# Patient Record
Sex: FEMALE | Race: WHITE | NOT HISPANIC OR LATINO
[De-identification: names, ages, dates, MRNs, and addresses within clinical notes are randomized per-mention and may not be internally consistent; named-entity substitution may affect disease eponyms.]

---

## 2017-01-04 ENCOUNTER — APPOINTMENT (OUTPATIENT)
Dept: PSYCHIATRY | Facility: CLINIC | Age: 67
End: 2017-01-04

## 2017-01-05 ENCOUNTER — APPOINTMENT (OUTPATIENT)
Dept: INTERNAL MEDICINE | Facility: CLINIC | Age: 67
End: 2017-01-05

## 2017-01-05 VITALS — SYSTOLIC BLOOD PRESSURE: 110 MMHG | DIASTOLIC BLOOD PRESSURE: 65 MMHG

## 2017-01-05 VITALS
HEART RATE: 65 BPM | SYSTOLIC BLOOD PRESSURE: 110 MMHG | TEMPERATURE: 97.6 F | RESPIRATION RATE: 14 BRPM | DIASTOLIC BLOOD PRESSURE: 70 MMHG

## 2017-01-09 ENCOUNTER — APPOINTMENT (OUTPATIENT)
Dept: PSYCHIATRY | Facility: CLINIC | Age: 67
End: 2017-01-09

## 2017-01-11 ENCOUNTER — APPOINTMENT (OUTPATIENT)
Dept: PSYCHIATRY | Facility: CLINIC | Age: 67
End: 2017-01-11

## 2017-01-17 ENCOUNTER — APPOINTMENT (OUTPATIENT)
Dept: OTOLARYNGOLOGY | Facility: CLINIC | Age: 67
End: 2017-01-17

## 2017-01-17 VITALS
BODY MASS INDEX: 21.35 KG/M2 | DIASTOLIC BLOOD PRESSURE: 75 MMHG | WEIGHT: 116 LBS | HEIGHT: 62 IN | SYSTOLIC BLOOD PRESSURE: 134 MMHG | HEART RATE: 64 BPM

## 2017-01-17 DIAGNOSIS — J34.2 DEVIATED NASAL SEPTUM: ICD-10-CM

## 2017-01-17 DIAGNOSIS — J34.3 HYPERTROPHY OF NASAL TURBINATES: ICD-10-CM

## 2017-01-18 ENCOUNTER — APPOINTMENT (OUTPATIENT)
Dept: PSYCHIATRY | Facility: CLINIC | Age: 67
End: 2017-01-18

## 2017-01-22 PROBLEM — J34.3 HYPERTROPHY OF BOTH INFERIOR NASAL TURBINATES: Status: ACTIVE | Noted: 2017-01-22

## 2017-01-23 ENCOUNTER — APPOINTMENT (OUTPATIENT)
Dept: PSYCHIATRY | Facility: CLINIC | Age: 67
End: 2017-01-23

## 2017-01-25 ENCOUNTER — APPOINTMENT (OUTPATIENT)
Dept: PSYCHIATRY | Facility: CLINIC | Age: 67
End: 2017-01-25

## 2017-01-30 ENCOUNTER — APPOINTMENT (OUTPATIENT)
Dept: PSYCHIATRY | Facility: CLINIC | Age: 67
End: 2017-01-30

## 2017-02-01 ENCOUNTER — OUTPATIENT (OUTPATIENT)
Dept: OUTPATIENT SERVICES | Facility: HOSPITAL | Age: 67
LOS: 1 days | Discharge: HOME | End: 2017-02-01

## 2017-02-01 ENCOUNTER — APPOINTMENT (OUTPATIENT)
Dept: PSYCHIATRY | Facility: CLINIC | Age: 67
End: 2017-02-01

## 2017-02-06 ENCOUNTER — APPOINTMENT (OUTPATIENT)
Dept: PSYCHIATRY | Facility: CLINIC | Age: 67
End: 2017-02-06

## 2017-02-08 ENCOUNTER — APPOINTMENT (OUTPATIENT)
Dept: PSYCHIATRY | Facility: CLINIC | Age: 67
End: 2017-02-08

## 2017-02-13 ENCOUNTER — APPOINTMENT (OUTPATIENT)
Dept: PSYCHIATRY | Facility: CLINIC | Age: 67
End: 2017-02-13

## 2017-02-15 ENCOUNTER — APPOINTMENT (OUTPATIENT)
Dept: PSYCHIATRY | Facility: CLINIC | Age: 67
End: 2017-02-15

## 2017-02-22 ENCOUNTER — APPOINTMENT (OUTPATIENT)
Dept: PSYCHIATRY | Facility: CLINIC | Age: 67
End: 2017-02-22

## 2017-02-23 DIAGNOSIS — F34.1 DYSTHYMIC DISORDER: ICD-10-CM

## 2017-02-27 ENCOUNTER — APPOINTMENT (OUTPATIENT)
Dept: PSYCHIATRY | Facility: CLINIC | Age: 67
End: 2017-02-27

## 2017-03-01 ENCOUNTER — APPOINTMENT (OUTPATIENT)
Dept: PSYCHIATRY | Facility: CLINIC | Age: 67
End: 2017-03-01

## 2017-03-06 ENCOUNTER — APPOINTMENT (OUTPATIENT)
Dept: PSYCHIATRY | Facility: CLINIC | Age: 67
End: 2017-03-06

## 2017-03-08 ENCOUNTER — APPOINTMENT (OUTPATIENT)
Dept: PSYCHIATRY | Facility: CLINIC | Age: 67
End: 2017-03-08

## 2017-03-09 ENCOUNTER — APPOINTMENT (OUTPATIENT)
Dept: PSYCHIATRY | Facility: CLINIC | Age: 67
End: 2017-03-09

## 2017-03-13 ENCOUNTER — APPOINTMENT (OUTPATIENT)
Dept: PSYCHIATRY | Facility: CLINIC | Age: 67
End: 2017-03-13

## 2017-03-15 ENCOUNTER — APPOINTMENT (OUTPATIENT)
Dept: PSYCHIATRY | Facility: CLINIC | Age: 67
End: 2017-03-15

## 2017-03-20 ENCOUNTER — APPOINTMENT (OUTPATIENT)
Dept: PSYCHIATRY | Facility: CLINIC | Age: 67
End: 2017-03-20

## 2017-03-22 ENCOUNTER — APPOINTMENT (OUTPATIENT)
Dept: PSYCHIATRY | Facility: CLINIC | Age: 67
End: 2017-03-22

## 2017-03-27 ENCOUNTER — APPOINTMENT (OUTPATIENT)
Dept: PSYCHIATRY | Facility: CLINIC | Age: 67
End: 2017-03-27

## 2017-03-29 ENCOUNTER — APPOINTMENT (OUTPATIENT)
Dept: PSYCHIATRY | Facility: CLINIC | Age: 67
End: 2017-03-29

## 2017-04-03 ENCOUNTER — APPOINTMENT (OUTPATIENT)
Dept: PSYCHIATRY | Facility: CLINIC | Age: 67
End: 2017-04-03

## 2017-04-03 ENCOUNTER — EMERGENCY (EMERGENCY)
Facility: HOSPITAL | Age: 67
LOS: 1 days | Discharge: HOME | End: 2017-04-03
Attending: EMERGENCY MEDICINE | Admitting: EMERGENCY MEDICINE
Payer: MEDICAID

## 2017-04-03 VITALS
RESPIRATION RATE: 17 BRPM | DIASTOLIC BLOOD PRESSURE: 77 MMHG | HEART RATE: 75 BPM | OXYGEN SATURATION: 97 % | HEIGHT: 64 IN | WEIGHT: 115.08 LBS | TEMPERATURE: 98 F | SYSTOLIC BLOOD PRESSURE: 153 MMHG

## 2017-04-03 VITALS
OXYGEN SATURATION: 98 % | HEART RATE: 68 BPM | RESPIRATION RATE: 16 BRPM | SYSTOLIC BLOOD PRESSURE: 141 MMHG | DIASTOLIC BLOOD PRESSURE: 64 MMHG | TEMPERATURE: 98 F

## 2017-04-03 DIAGNOSIS — E78.00 PURE HYPERCHOLESTEROLEMIA, UNSPECIFIED: ICD-10-CM

## 2017-04-03 DIAGNOSIS — S69.92XA UNSPECIFIED INJURY OF LEFT WRIST, HAND AND FINGER(S), INITIAL ENCOUNTER: ICD-10-CM
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
## 2017-04-03 DIAGNOSIS — Z88.0 ALLERGY STATUS TO PENICILLIN: ICD-10-CM
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
## 2017-04-03 DIAGNOSIS — S52.592A OTHER FRACTURES OF LOWER END OF LEFT RADIUS, INITIAL ENCOUNTER FOR CLOSED FRACTURE: ICD-10-CM

## 2017-04-03 DIAGNOSIS — I10 ESSENTIAL (PRIMARY) HYPERTENSION: ICD-10-CM
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
## 2017-04-03 PROCEDURE — 99285 EMERGENCY DEPT VISIT HI MDM: CPT | Mod: 25

## 2017-04-03 PROCEDURE — 73110 X-RAY EXAM OF WRIST: CPT

## 2017-04-03 PROCEDURE — 72100 X-RAY EXAM L-S SPINE 2/3 VWS: CPT | Mod: 26

## 2017-04-03 PROCEDURE — 73110 X-RAY EXAM OF WRIST: CPT | Mod: 26,LT

## 2017-04-03 PROCEDURE — 72100 X-RAY EXAM L-S SPINE 2/3 VWS: CPT

## 2017-04-03 PROCEDURE — 25605 CLTX DST RDL FX/EPHYS SEP W/: CPT | Mod: LT

## 2017-04-03 PROCEDURE — 99284 EMERGENCY DEPT VISIT MOD MDM: CPT

## 2017-04-03 RX ORDER — ACETAMINOPHEN 500 MG
975 TABLET ORAL ONCE
Qty: 0 | Refills: 0 | Status: COMPLETED | OUTPATIENT
Start: 2017-04-03 | End: 2017-04-03

## 2017-04-03 RX ADMIN — Medication 975 MILLIGRAM(S): at 17:55

## 2017-04-03 NOTE — CONSULT NOTE ADULT - SUBJECTIVE AND OBJECTIVE BOX
Orthopaedic Consult Note    Consult Requested by: ED  Surgeon: Dr. Whitmore    CC:Patient is a 66y old  Female who presents with a chief complaint of L wrist pain    HPI  66yFemale  c/o L wrist pain after fall while cleaning at home.     PAST MEDICAL & SURGICAL HISTORY:  High cholesterol  HTN (hypertension)  High cholesterol  HTN (hypertension)  No significant past surgical history    Allergies    penicillin (Unknown)    Intolerances      MEDICATIONS  (STANDING):      Vital Signs Last 24 Hrs  T(C): 36.8, Max: 36.8 (04-03 @ 16:47)  T(F): 98.3, Max: 98.3 (04-03 @ 16:47)  HR: 75 (75 - 75)  BP: 153/77 (153/77 - 153/77)  BP(mean): --  RR: 17 (17 - 17)  SpO2: 97% (97% - 97%)    Physical Exam:  Left wrist pain to palpation   Able to range wrist joint   Motor 5/5 UN/RN/MN  Sensation intact  Moderate swelling at fracture site  wwp        Imaging:   Left distal radius fracture with dorsal displacement and loss of volar tilt and radial height  Reduction xrays confirming adequate reduction    A/P: 66yFemale w/ above diagnosis  - Verbal consent was obtained and risks of procedure explained  - Local anesthesia injected at fracture site with mild tinge of flashback  - 6cc lidocaine injected  - Forearm reduced with traction and then manual reduction with confirmation by c-arm  - U-shaped splint applied and 3-point molding achieved  - Adequate finger ROM and exam without any changes from pre-reduction exam was obtained postreduction  - Patient then placed in sling  - F/U in 2 weeks with Dr. Whitmore  - Pain control   - Elevation  Discussed with Dr. Molina senior resident

## 2017-04-03 NOTE — ED PROVIDER NOTE - OBJECTIVE STATEMENT
65 yo fem s/p fall backward off 1 foot high stepstool onto her low back and left wrist.  occurred today at home. Denies hitting head.  No neck/back pain. No LOC.  c/o pain left wrist.  no tingling/numbness.

## 2017-04-03 NOTE — ED ADULT NURSE NOTE - CHPI ED SYMPTOMS NEG
no fever/no tingling/no vomiting/no numbness/no decreased eating/drinking/no dizziness/no weakness/no chills/no nausea

## 2017-04-03 NOTE — ED PROVIDER NOTE - MEDICAL DECISION MAKING DETAILS
67 yo fem s/p fall backward off 1 foot high stepstool onto her low back and left wrist.  No head injury and no c-spine imaging needed per NEXUS criteria.  XR shows left distal radiaus colles fx, reduced and splinted by ortho.

## 2017-04-03 NOTE — ED ADULT NURSE NOTE - OBJECTIVE STATEMENT
67 yo F c/o left wrist pain after fall.  Pt states "I was cleaning my mirror when I fell, I put my hand behind me so I wouldn't hurt my back and it now hurts to move".  Pt denies dizziness, lightheadedness before fall and states "I lost balance and fell".  Pt states wrist hurts with movement. Pt A&Ox3 with no s.s of acute distress. Will continue to monitor.

## 2017-04-03 NOTE — ED PROVIDER NOTE - PHYSICAL EXAMINATION
CONSTITUTIONAL: WD,WN. NAD.    HEAD: NC/AT  SKIN: Normal color and turgor. No abr/lac/bruise  EYES: Conjunctiva clear. EOMI. PERRL.    ENT: Nasal mucosa clear, no rhinorrhea.  Pharynx clear, no injection/edema/exudate, uvula is midline and without edema.    RESPIRATORY:  Breathing non-labored. No retractions, accessory muscle use.  Lungs CTA bilaterally.  CARDIOVASCULAR:  RRR, S1S2. No M/R/G.      GI:   Bowel sounds present. Abdomen soft, nontender.    MSK: Lt wrist tenderness and slight colles deformity.  No neck or back tenderness. Painless FROM.  No lower extremity edema or calf tenderness.    NEURO: Alert and oriented; normal motor and sensory function in MRU.  CN II-XII intact. Coordination: normal F-N, normal gait.

## 2017-04-03 NOTE — ED ADULT NURSE REASSESSMENT NOTE - NS ED NURSE REASSESS COMMENT FT1
Pt A&Ox3 with no s.s of acute distress. Pt OOB with steady gait. Pt awaiting repeat x-ray.  Will continue to monitor.

## 2017-04-03 NOTE — ED ADULT TRIAGE NOTE - CHIEF COMPLAINT QUOTE
Pt c/o L wrist pain status post fall . Notable swelling to L wrist. No bruising noted. Radial pulses present. Cap refill 1 second.

## 2017-04-03 NOTE — ED PROVIDER NOTE - NS ED ROS FT
CONSTITUTIONAL: No fever, chills, or weakness  NEURO: No headache, no dizziness, no syncope; No weakness/tingling/numbness  EYES: No visual changes  ENT: No rhinorrhea or sore throat  PULM: No cough or dyspnea  CV: No chest pain or palpitations  GI: No abdominal pain, vomiting, or diarrhea  : No dysuria, hematuria, frequency  MSK: No neck pain or back pain  SKIN: no abr lac bruise

## 2017-04-05 ENCOUNTER — APPOINTMENT (OUTPATIENT)
Dept: PSYCHIATRY | Facility: CLINIC | Age: 67
End: 2017-04-05

## 2017-04-10 ENCOUNTER — APPOINTMENT (OUTPATIENT)
Dept: PSYCHIATRY | Facility: CLINIC | Age: 67
End: 2017-04-10

## 2017-04-12 ENCOUNTER — APPOINTMENT (OUTPATIENT)
Dept: PSYCHIATRY | Facility: CLINIC | Age: 67
End: 2017-04-12

## 2017-04-13 ENCOUNTER — APPOINTMENT (OUTPATIENT)
Dept: INTERNAL MEDICINE | Facility: CLINIC | Age: 67
End: 2017-04-13

## 2017-04-17 ENCOUNTER — APPOINTMENT (OUTPATIENT)
Dept: PSYCHIATRY | Facility: CLINIC | Age: 67
End: 2017-04-17

## 2017-04-19 ENCOUNTER — APPOINTMENT (OUTPATIENT)
Dept: PSYCHIATRY | Facility: CLINIC | Age: 67
End: 2017-04-19

## 2017-04-24 ENCOUNTER — APPOINTMENT (OUTPATIENT)
Dept: PSYCHIATRY | Facility: CLINIC | Age: 67
End: 2017-04-24

## 2017-04-26 ENCOUNTER — APPOINTMENT (OUTPATIENT)
Dept: PSYCHIATRY | Facility: CLINIC | Age: 67
End: 2017-04-26

## 2017-05-01 ENCOUNTER — APPOINTMENT (OUTPATIENT)
Dept: PSYCHIATRY | Facility: CLINIC | Age: 67
End: 2017-05-01

## 2017-05-03 ENCOUNTER — APPOINTMENT (OUTPATIENT)
Dept: PSYCHIATRY | Facility: CLINIC | Age: 67
End: 2017-05-03

## 2017-05-08 ENCOUNTER — APPOINTMENT (OUTPATIENT)
Dept: PSYCHIATRY | Facility: CLINIC | Age: 67
End: 2017-05-08

## 2017-05-10 ENCOUNTER — APPOINTMENT (OUTPATIENT)
Dept: PSYCHIATRY | Facility: CLINIC | Age: 67
End: 2017-05-10

## 2017-05-15 ENCOUNTER — APPOINTMENT (OUTPATIENT)
Dept: PSYCHIATRY | Facility: CLINIC | Age: 67
End: 2017-05-15

## 2017-05-17 ENCOUNTER — APPOINTMENT (OUTPATIENT)
Dept: PSYCHIATRY | Facility: CLINIC | Age: 67
End: 2017-05-17

## 2017-05-22 ENCOUNTER — APPOINTMENT (OUTPATIENT)
Dept: PSYCHIATRY | Facility: CLINIC | Age: 67
End: 2017-05-22

## 2017-05-24 ENCOUNTER — APPOINTMENT (OUTPATIENT)
Dept: PSYCHIATRY | Facility: CLINIC | Age: 67
End: 2017-05-24

## 2017-05-31 ENCOUNTER — APPOINTMENT (OUTPATIENT)
Dept: PSYCHIATRY | Facility: CLINIC | Age: 67
End: 2017-05-31

## 2017-06-05 ENCOUNTER — APPOINTMENT (OUTPATIENT)
Dept: PSYCHIATRY | Facility: CLINIC | Age: 67
End: 2017-06-05

## 2017-06-07 ENCOUNTER — APPOINTMENT (OUTPATIENT)
Dept: PSYCHIATRY | Facility: CLINIC | Age: 67
End: 2017-06-07

## 2017-06-08 ENCOUNTER — APPOINTMENT (OUTPATIENT)
Dept: INTERNAL MEDICINE | Facility: CLINIC | Age: 67
End: 2017-06-08

## 2017-06-08 VITALS
BODY MASS INDEX: 21.22 KG/M2 | HEART RATE: 64 BPM | OXYGEN SATURATION: 98 % | DIASTOLIC BLOOD PRESSURE: 80 MMHG | SYSTOLIC BLOOD PRESSURE: 140 MMHG | TEMPERATURE: 97.8 F | WEIGHT: 116 LBS

## 2017-06-12 ENCOUNTER — APPOINTMENT (OUTPATIENT)
Dept: PSYCHIATRY | Facility: CLINIC | Age: 67
End: 2017-06-12

## 2017-06-14 ENCOUNTER — APPOINTMENT (OUTPATIENT)
Dept: PSYCHIATRY | Facility: CLINIC | Age: 67
End: 2017-06-14

## 2017-06-19 ENCOUNTER — APPOINTMENT (OUTPATIENT)
Dept: PSYCHIATRY | Facility: CLINIC | Age: 67
End: 2017-06-19

## 2017-06-21 ENCOUNTER — APPOINTMENT (OUTPATIENT)
Dept: PSYCHIATRY | Facility: CLINIC | Age: 67
End: 2017-06-21

## 2017-06-26 ENCOUNTER — APPOINTMENT (OUTPATIENT)
Dept: PSYCHIATRY | Facility: CLINIC | Age: 67
End: 2017-06-26

## 2017-06-28 ENCOUNTER — APPOINTMENT (OUTPATIENT)
Dept: PSYCHIATRY | Facility: CLINIC | Age: 67
End: 2017-06-28

## 2017-06-29 ENCOUNTER — MEDICATION RENEWAL (OUTPATIENT)
Age: 67
End: 2017-06-29

## 2017-07-05 ENCOUNTER — APPOINTMENT (OUTPATIENT)
Dept: PSYCHIATRY | Facility: CLINIC | Age: 67
End: 2017-07-05

## 2017-07-12 ENCOUNTER — APPOINTMENT (OUTPATIENT)
Dept: PSYCHIATRY | Facility: CLINIC | Age: 67
End: 2017-07-12

## 2017-07-19 ENCOUNTER — APPOINTMENT (OUTPATIENT)
Dept: PSYCHIATRY | Facility: CLINIC | Age: 67
End: 2017-07-19

## 2017-07-19 ENCOUNTER — APPOINTMENT (OUTPATIENT)
Dept: OPHTHALMOLOGY | Facility: CLINIC | Age: 67
End: 2017-07-19

## 2017-07-19 DIAGNOSIS — H26.9 UNSPECIFIED CATARACT: ICD-10-CM

## 2017-07-19 DIAGNOSIS — H35.433 PAVING STONE DEGENERATION OF RETINA, BILATERAL: ICD-10-CM

## 2017-07-26 ENCOUNTER — APPOINTMENT (OUTPATIENT)
Dept: PSYCHIATRY | Facility: CLINIC | Age: 67
End: 2017-07-26

## 2017-08-01 ENCOUNTER — OUTPATIENT (OUTPATIENT)
Dept: OUTPATIENT SERVICES | Facility: HOSPITAL | Age: 67
LOS: 1 days | Discharge: HOME | End: 2017-08-01

## 2017-08-02 ENCOUNTER — APPOINTMENT (OUTPATIENT)
Dept: PSYCHIATRY | Facility: CLINIC | Age: 67
End: 2017-08-02
Payer: MEDICAID

## 2017-08-02 PROCEDURE — 90853 GROUP PSYCHOTHERAPY: CPT

## 2017-08-02 PROCEDURE — 90834 PSYTX W PT 45 MINUTES: CPT | Mod: 59

## 2017-08-09 ENCOUNTER — APPOINTMENT (OUTPATIENT)
Dept: PSYCHIATRY | Facility: CLINIC | Age: 67
End: 2017-08-09

## 2017-08-16 ENCOUNTER — APPOINTMENT (OUTPATIENT)
Dept: PSYCHIATRY | Facility: CLINIC | Age: 67
End: 2017-08-16

## 2017-08-23 ENCOUNTER — APPOINTMENT (OUTPATIENT)
Dept: PSYCHIATRY | Facility: CLINIC | Age: 67
End: 2017-08-23
Payer: MEDICAID

## 2017-08-23 PROCEDURE — 90834 PSYTX W PT 45 MINUTES: CPT | Mod: 59

## 2017-08-23 PROCEDURE — 90853 GROUP PSYCHOTHERAPY: CPT

## 2017-08-30 ENCOUNTER — APPOINTMENT (OUTPATIENT)
Dept: PSYCHIATRY | Facility: CLINIC | Age: 67
End: 2017-08-30
Payer: MEDICAID

## 2017-08-30 PROCEDURE — 90834 PSYTX W PT 45 MINUTES: CPT | Mod: 59

## 2017-08-30 PROCEDURE — 90853 GROUP PSYCHOTHERAPY: CPT

## 2017-09-01 DIAGNOSIS — F32.9 MAJOR DEPRESSIVE DISORDER, SINGLE EPISODE, UNSPECIFIED: ICD-10-CM

## 2017-09-06 ENCOUNTER — APPOINTMENT (OUTPATIENT)
Dept: PSYCHIATRY | Facility: CLINIC | Age: 67
End: 2017-09-06
Payer: MEDICAID

## 2017-09-06 PROCEDURE — 90853 GROUP PSYCHOTHERAPY: CPT

## 2017-09-06 PROCEDURE — 90834 PSYTX W PT 45 MINUTES: CPT

## 2017-09-13 ENCOUNTER — APPOINTMENT (OUTPATIENT)
Dept: PSYCHIATRY | Facility: CLINIC | Age: 67
End: 2017-09-13

## 2017-09-20 ENCOUNTER — APPOINTMENT (OUTPATIENT)
Dept: PSYCHIATRY | Facility: CLINIC | Age: 67
End: 2017-09-20
Payer: MEDICAID

## 2017-09-20 PROCEDURE — 90834 PSYTX W PT 45 MINUTES: CPT | Mod: 59

## 2017-09-20 PROCEDURE — 90853 GROUP PSYCHOTHERAPY: CPT

## 2017-09-26 ENCOUNTER — APPOINTMENT (OUTPATIENT)
Dept: INTERNAL MEDICINE | Facility: CLINIC | Age: 67
End: 2017-09-26
Payer: MEDICAID

## 2017-09-26 VITALS
HEIGHT: 62 IN | SYSTOLIC BLOOD PRESSURE: 135 MMHG | BODY MASS INDEX: 20.06 KG/M2 | WEIGHT: 109 LBS | DIASTOLIC BLOOD PRESSURE: 74 MMHG | OXYGEN SATURATION: 99 % | TEMPERATURE: 98.5 F | HEART RATE: 80 BPM

## 2017-09-26 DIAGNOSIS — S62.102A FRACTURE OF UNSPECIFIED CARPAL BONE, LEFT WRIST, INITIAL ENCOUNTER FOR CLOSED FRACTURE: ICD-10-CM

## 2017-09-26 PROCEDURE — 99213 OFFICE O/P EST LOW 20 MIN: CPT | Mod: GE

## 2017-09-26 PROCEDURE — 36415 COLL VENOUS BLD VENIPUNCTURE: CPT

## 2017-09-26 PROCEDURE — 99396 PREV VISIT EST AGE 40-64: CPT | Mod: 25

## 2017-09-27 ENCOUNTER — APPOINTMENT (OUTPATIENT)
Dept: PSYCHIATRY | Facility: CLINIC | Age: 67
End: 2017-09-27
Payer: MEDICAID

## 2017-09-27 PROBLEM — S62.102A FRACTURE OF LEFT WRIST: Status: ACTIVE | Noted: 2017-04-05

## 2017-09-27 PROCEDURE — 90853 GROUP PSYCHOTHERAPY: CPT

## 2017-09-27 PROCEDURE — 90834 PSYTX W PT 45 MINUTES: CPT | Mod: 59

## 2017-09-28 LAB
25(OH)D3 SERPL-MCNC: 55.6 NG/ML
ANION GAP SERPL CALC-SCNC: 18 MMOL/L
APPEARANCE: CLEAR
BILIRUBIN URINE: NEGATIVE
BLOOD URINE: NEGATIVE
BUN SERPL-MCNC: 16 MG/DL
C TRACH RRNA SPEC QL NAA+PROBE: NORMAL
CA-I SERPL-SCNC: 1.2 MMOL/L
CALCIUM SERPL-MCNC: 10.5 MG/DL
CALCIUM SERPL-MCNC: 10.5 MG/DL
CHLORIDE SERPL-SCNC: 102 MMOL/L
CHOLEST SERPL-MCNC: 193 MG/DL
CHOLEST/HDLC SERPL: 1.6 RATIO
CO2 SERPL-SCNC: 22 MMOL/L
COLOR: YELLOW
CREAT SERPL-MCNC: 0.71 MG/DL
GLUCOSE QUALITATIVE U: NORMAL MG/DL
GLUCOSE SERPL-MCNC: 92 MG/DL
HDLC SERPL-MCNC: 119 MG/DL
KETONES URINE: ABNORMAL
LDLC SERPL CALC-MCNC: 57 MG/DL
LEUKOCYTE ESTERASE URINE: NEGATIVE
N GONORRHOEA RRNA SPEC QL NAA+PROBE: NORMAL
NITRITE URINE: NEGATIVE
PARATHYROID HORMONE INTACT: 57 PG/ML
PH URINE: 7.5
POTASSIUM SERPL-SCNC: 5.2 MMOL/L
PROTEIN URINE: NEGATIVE MG/DL
SODIUM SERPL-SCNC: 142 MMOL/L
SOURCE AMPLIFICATION: NORMAL
SPECIFIC GRAVITY URINE: 1.02
TRIGL SERPL-MCNC: 85 MG/DL
UROBILINOGEN URINE: NORMAL MG/DL

## 2017-10-04 ENCOUNTER — APPOINTMENT (OUTPATIENT)
Dept: PSYCHIATRY | Facility: CLINIC | Age: 67
End: 2017-10-04
Payer: MEDICAID

## 2017-10-04 PROCEDURE — 90853 GROUP PSYCHOTHERAPY: CPT

## 2017-10-04 PROCEDURE — 90834 PSYTX W PT 45 MINUTES: CPT | Mod: 59

## 2017-10-11 ENCOUNTER — APPOINTMENT (OUTPATIENT)
Dept: PSYCHIATRY | Facility: CLINIC | Age: 67
End: 2017-10-11

## 2017-10-18 ENCOUNTER — APPOINTMENT (OUTPATIENT)
Dept: PSYCHIATRY | Facility: CLINIC | Age: 67
End: 2017-10-18
Payer: MEDICAID

## 2017-10-18 PROCEDURE — 90834 PSYTX W PT 45 MINUTES: CPT | Mod: 59

## 2017-10-18 PROCEDURE — 90853 GROUP PSYCHOTHERAPY: CPT

## 2017-10-20 ENCOUNTER — APPOINTMENT (OUTPATIENT)
Dept: INTERNAL MEDICINE | Facility: CLINIC | Age: 67
End: 2017-10-20
Payer: MEDICAID

## 2017-10-20 VITALS
WEIGHT: 108 LBS | SYSTOLIC BLOOD PRESSURE: 121 MMHG | BODY MASS INDEX: 19.75 KG/M2 | TEMPERATURE: 97.8 F | OXYGEN SATURATION: 98 % | DIASTOLIC BLOOD PRESSURE: 74 MMHG | HEART RATE: 72 BPM

## 2017-10-20 DIAGNOSIS — Z87.891 PERSONAL HISTORY OF NICOTINE DEPENDENCE: ICD-10-CM

## 2017-10-20 PROCEDURE — 99213 OFFICE O/P EST LOW 20 MIN: CPT | Mod: GE

## 2017-10-20 RX ORDER — CEPHALEXIN 500 MG/1
500 CAPSULE ORAL
Qty: 12 | Refills: 0 | Status: DISCONTINUED | COMMUNITY
Start: 2017-04-13

## 2017-10-20 RX ORDER — OXYCODONE AND ACETAMINOPHEN 5; 325 MG/1; MG/1
5-325 TABLET ORAL
Qty: 60 | Refills: 0 | Status: DISCONTINUED | COMMUNITY
Start: 2017-04-13

## 2017-10-25 ENCOUNTER — APPOINTMENT (OUTPATIENT)
Dept: PSYCHIATRY | Facility: CLINIC | Age: 67
End: 2017-10-25

## 2017-10-25 ENCOUNTER — FORM ENCOUNTER (OUTPATIENT)
Age: 67
End: 2017-10-25

## 2017-10-26 ENCOUNTER — OUTPATIENT (OUTPATIENT)
Dept: OUTPATIENT SERVICES | Facility: HOSPITAL | Age: 67
LOS: 1 days | End: 2017-10-26
Payer: MEDICAID

## 2017-10-26 PROCEDURE — 93880 EXTRACRANIAL BILAT STUDY: CPT | Mod: 26

## 2017-10-26 PROCEDURE — 93880 EXTRACRANIAL BILAT STUDY: CPT

## 2017-11-01 ENCOUNTER — APPOINTMENT (OUTPATIENT)
Dept: PSYCHIATRY | Facility: CLINIC | Age: 67
End: 2017-11-01
Payer: MEDICAID

## 2017-11-01 PROCEDURE — 90834 PSYTX W PT 45 MINUTES: CPT | Mod: 59

## 2017-11-01 PROCEDURE — 90853 GROUP PSYCHOTHERAPY: CPT

## 2017-11-08 ENCOUNTER — APPOINTMENT (OUTPATIENT)
Dept: PSYCHIATRY | Facility: CLINIC | Age: 67
End: 2017-11-08

## 2017-11-15 ENCOUNTER — APPOINTMENT (OUTPATIENT)
Dept: PSYCHIATRY | Facility: CLINIC | Age: 67
End: 2017-11-15

## 2017-11-15 ENCOUNTER — APPOINTMENT (OUTPATIENT)
Dept: PSYCHIATRY | Facility: CLINIC | Age: 67
End: 2017-11-15
Payer: MEDICAID

## 2017-11-15 PROCEDURE — 90853 GROUP PSYCHOTHERAPY: CPT

## 2017-11-22 ENCOUNTER — APPOINTMENT (OUTPATIENT)
Dept: PSYCHIATRY | Facility: CLINIC | Age: 67
End: 2017-11-22

## 2017-11-28 ENCOUNTER — APPOINTMENT (OUTPATIENT)
Dept: ORTHOPEDIC SURGERY | Facility: CLINIC | Age: 67
End: 2017-11-28
Payer: MEDICAID

## 2017-11-28 VITALS
HEIGHT: 61.02 IN | SYSTOLIC BLOOD PRESSURE: 144 MMHG | OXYGEN SATURATION: 98 % | BODY MASS INDEX: 20.27 KG/M2 | DIASTOLIC BLOOD PRESSURE: 80 MMHG | WEIGHT: 107.36 LBS | HEART RATE: 63 BPM

## 2017-11-28 PROCEDURE — 99205 OFFICE O/P NEW HI 60 MIN: CPT

## 2017-11-29 ENCOUNTER — APPOINTMENT (OUTPATIENT)
Dept: PSYCHIATRY | Facility: CLINIC | Age: 67
End: 2017-11-29
Payer: MEDICAID

## 2017-11-29 PROCEDURE — 90834 PSYTX W PT 45 MINUTES: CPT | Mod: 59

## 2017-11-29 PROCEDURE — 90853 GROUP PSYCHOTHERAPY: CPT

## 2017-12-06 ENCOUNTER — APPOINTMENT (OUTPATIENT)
Dept: PSYCHIATRY | Facility: CLINIC | Age: 67
End: 2017-12-06

## 2017-12-13 ENCOUNTER — APPOINTMENT (OUTPATIENT)
Dept: PSYCHIATRY | Facility: CLINIC | Age: 67
End: 2017-12-13
Payer: MEDICAID

## 2017-12-13 PROCEDURE — 90853 GROUP PSYCHOTHERAPY: CPT

## 2017-12-13 PROCEDURE — 90834 PSYTX W PT 45 MINUTES: CPT | Mod: 59

## 2017-12-19 ENCOUNTER — APPOINTMENT (OUTPATIENT)
Dept: GASTROENTEROLOGY | Facility: CLINIC | Age: 67
End: 2017-12-19
Payer: MEDICAID

## 2017-12-19 VITALS
BODY MASS INDEX: 19.63 KG/M2 | HEIGHT: 61.02 IN | SYSTOLIC BLOOD PRESSURE: 106 MMHG | OXYGEN SATURATION: 99 % | HEART RATE: 59 BPM | DIASTOLIC BLOOD PRESSURE: 80 MMHG | WEIGHT: 104 LBS | RESPIRATION RATE: 14 BRPM | TEMPERATURE: 98 F

## 2017-12-19 PROCEDURE — 99213 OFFICE O/P EST LOW 20 MIN: CPT

## 2017-12-19 PROCEDURE — 99203 OFFICE O/P NEW LOW 30 MIN: CPT

## 2017-12-20 ENCOUNTER — APPOINTMENT (OUTPATIENT)
Dept: PSYCHIATRY | Facility: CLINIC | Age: 67
End: 2017-12-20
Payer: MEDICAID

## 2017-12-20 ENCOUNTER — APPOINTMENT (OUTPATIENT)
Dept: PSYCHIATRY | Facility: CLINIC | Age: 67
End: 2017-12-20
Payer: COMMERCIAL

## 2017-12-20 PROCEDURE — 90834 PSYTX W PT 45 MINUTES: CPT | Mod: 59

## 2017-12-20 PROCEDURE — 90853 GROUP PSYCHOTHERAPY: CPT

## 2017-12-27 ENCOUNTER — APPOINTMENT (OUTPATIENT)
Dept: PSYCHIATRY | Facility: CLINIC | Age: 67
End: 2017-12-27

## 2018-01-03 ENCOUNTER — APPOINTMENT (OUTPATIENT)
Dept: PSYCHIATRY | Facility: CLINIC | Age: 68
End: 2018-01-03

## 2018-01-09 ENCOUNTER — APPOINTMENT (OUTPATIENT)
Dept: INTERNAL MEDICINE | Facility: CLINIC | Age: 68
End: 2018-01-09
Payer: MEDICAID

## 2018-01-09 VITALS
DIASTOLIC BLOOD PRESSURE: 80 MMHG | BODY MASS INDEX: 19.64 KG/M2 | TEMPERATURE: 98.2 F | HEART RATE: 69 BPM | WEIGHT: 104 LBS | SYSTOLIC BLOOD PRESSURE: 130 MMHG

## 2018-01-09 PROCEDURE — 90732 PPSV23 VACC 2 YRS+ SUBQ/IM: CPT

## 2018-01-09 PROCEDURE — 99213 OFFICE O/P EST LOW 20 MIN: CPT | Mod: 25,GE

## 2018-01-09 PROCEDURE — G0009: CPT

## 2018-01-10 ENCOUNTER — APPOINTMENT (OUTPATIENT)
Dept: PSYCHIATRY | Facility: CLINIC | Age: 68
End: 2018-01-10

## 2018-01-17 ENCOUNTER — APPOINTMENT (OUTPATIENT)
Dept: PSYCHIATRY | Facility: CLINIC | Age: 68
End: 2018-01-17

## 2018-01-24 ENCOUNTER — APPOINTMENT (OUTPATIENT)
Dept: PSYCHIATRY | Facility: CLINIC | Age: 68
End: 2018-01-24

## 2018-01-31 ENCOUNTER — APPOINTMENT (OUTPATIENT)
Dept: PSYCHIATRY | Facility: CLINIC | Age: 68
End: 2018-01-31

## 2018-02-07 ENCOUNTER — APPOINTMENT (OUTPATIENT)
Dept: PSYCHIATRY | Facility: CLINIC | Age: 68
End: 2018-02-07

## 2018-02-14 ENCOUNTER — APPOINTMENT (OUTPATIENT)
Dept: PSYCHIATRY | Facility: CLINIC | Age: 68
End: 2018-02-14

## 2018-02-21 ENCOUNTER — APPOINTMENT (OUTPATIENT)
Dept: PSYCHIATRY | Facility: CLINIC | Age: 68
End: 2018-02-21

## 2018-02-28 ENCOUNTER — APPOINTMENT (OUTPATIENT)
Dept: PSYCHIATRY | Facility: CLINIC | Age: 68
End: 2018-02-28

## 2018-03-07 ENCOUNTER — APPOINTMENT (OUTPATIENT)
Dept: PSYCHIATRY | Facility: CLINIC | Age: 68
End: 2018-03-07

## 2018-03-09 ENCOUNTER — APPOINTMENT (OUTPATIENT)
Dept: INTERNAL MEDICINE | Facility: CLINIC | Age: 68
End: 2018-03-09
Payer: MEDICARE

## 2018-03-09 VITALS
HEART RATE: 65 BPM | SYSTOLIC BLOOD PRESSURE: 141 MMHG | DIASTOLIC BLOOD PRESSURE: 79 MMHG | RESPIRATION RATE: 15 BRPM | WEIGHT: 106 LBS | TEMPERATURE: 97.1 F | BODY MASS INDEX: 20.02 KG/M2 | OXYGEN SATURATION: 98 %

## 2018-03-09 VITALS — SYSTOLIC BLOOD PRESSURE: 135 MMHG | DIASTOLIC BLOOD PRESSURE: 70 MMHG

## 2018-03-09 DIAGNOSIS — Z78.9 OTHER SPECIFIED HEALTH STATUS: ICD-10-CM

## 2018-03-09 DIAGNOSIS — R09.89 OTHER SPECIFIED SYMPTOMS AND SIGNS INVOLVING THE CIRCULATORY AND RESPIRATORY SYSTEMS: ICD-10-CM

## 2018-03-09 DIAGNOSIS — F41.8 OTHER SPECIFIED ANXIETY DISORDERS: ICD-10-CM

## 2018-03-09 PROCEDURE — G0442 ANNUAL ALCOHOL SCREEN 15 MIN: CPT

## 2018-03-09 PROCEDURE — G0443: CPT

## 2018-03-09 PROCEDURE — G0444 DEPRESSION SCREEN ANNUAL: CPT

## 2018-03-09 PROCEDURE — G0439: CPT | Mod: 25,GC

## 2018-03-09 PROCEDURE — 36415 COLL VENOUS BLD VENIPUNCTURE: CPT

## 2018-03-14 ENCOUNTER — APPOINTMENT (OUTPATIENT)
Dept: PSYCHIATRY | Facility: CLINIC | Age: 68
End: 2018-03-14

## 2018-03-16 LAB
ALBUMIN SERPL ELPH-MCNC: 4.5 G/DL
ALP BLD-CCNC: 71 U/L
ALT SERPL-CCNC: 22 U/L
ANION GAP SERPL CALC-SCNC: 18 MMOL/L
ANION GAP SERPL CALC-SCNC: 20 MMOL/L
AST SERPL-CCNC: 29 U/L
BASOPHILS # BLD AUTO: 0.02 K/UL
BASOPHILS NFR BLD AUTO: 0.3 %
BILIRUB SERPL-MCNC: 0.2 MG/DL
BUN SERPL-MCNC: 10 MG/DL
BUN SERPL-MCNC: 11 MG/DL
CALCIUM SERPL-MCNC: 10.4 MG/DL
CALCIUM SERPL-MCNC: 10.5 MG/DL
CHLORIDE SERPL-SCNC: 102 MMOL/L
CHLORIDE SERPL-SCNC: 104 MMOL/L
CHOLEST SERPL-MCNC: 227 MG/DL
CHOLEST/HDLC SERPL: 2.2 RATIO
CO2 SERPL-SCNC: 17 MMOL/L
CO2 SERPL-SCNC: 20 MMOL/L
CREAT SERPL-MCNC: 0.78 MG/DL
CREAT SERPL-MCNC: 0.78 MG/DL
CREAT SPEC-SCNC: 45 MG/DL
CREAT/PROT UR: 0.2 RATIO
EOSINOPHIL # BLD AUTO: 0.09 K/UL
EOSINOPHIL NFR BLD AUTO: 1.2 %
GLUCOSE SERPL-MCNC: 100 MG/DL
GLUCOSE SERPL-MCNC: 102 MG/DL
HCT VFR BLD CALC: 44.2 %
HDLC SERPL-MCNC: 105 MG/DL
HGB BLD-MCNC: 14.6 G/DL
IMM GRANULOCYTES NFR BLD AUTO: 0.1 %
LDLC SERPL CALC-MCNC: 88 MG/DL
LYMPHOCYTES # BLD AUTO: 2.25 K/UL
LYMPHOCYTES NFR BLD AUTO: 29.3 %
MAN DIFF?: NORMAL
MCHC RBC-ENTMCNC: 33 GM/DL
MCHC RBC-ENTMCNC: 33.6 PG
MCV RBC AUTO: 101.8 FL
MONOCYTES # BLD AUTO: 0.44 K/UL
MONOCYTES NFR BLD AUTO: 5.7 %
NEUTROPHILS # BLD AUTO: 4.87 K/UL
NEUTROPHILS NFR BLD AUTO: 63.4 %
PLATELET # BLD AUTO: 333 K/UL
POTASSIUM SERPL-SCNC: 4.4 MMOL/L
POTASSIUM SERPL-SCNC: 4.4 MMOL/L
PROT SERPL-MCNC: 7.1 G/DL
PROT UR-MCNC: 7 MG/DL
RBC # BLD: 4.34 M/UL
RBC # FLD: 13.4 %
SODIUM SERPL-SCNC: 140 MMOL/L
SODIUM SERPL-SCNC: 141 MMOL/L
TRIGL SERPL-MCNC: 172 MG/DL
TSH SERPL-ACNC: 1.2 UIU/ML
WBC # FLD AUTO: 7.68 K/UL

## 2018-03-21 ENCOUNTER — APPOINTMENT (OUTPATIENT)
Dept: PSYCHIATRY | Facility: CLINIC | Age: 68
End: 2018-03-21

## 2018-03-28 ENCOUNTER — APPOINTMENT (OUTPATIENT)
Dept: PSYCHIATRY | Facility: CLINIC | Age: 68
End: 2018-03-28

## 2018-04-26 ENCOUNTER — APPOINTMENT (OUTPATIENT)
Dept: ORTHOPEDIC SURGERY | Facility: CLINIC | Age: 68
End: 2018-04-26
Payer: MEDICARE

## 2018-04-26 VITALS
WEIGHT: 108 LBS | BODY MASS INDEX: 20.39 KG/M2 | OXYGEN SATURATION: 98 % | HEIGHT: 61 IN | DIASTOLIC BLOOD PRESSURE: 84 MMHG | HEART RATE: 64 BPM | SYSTOLIC BLOOD PRESSURE: 138 MMHG

## 2018-04-26 PROCEDURE — 99215 OFFICE O/P EST HI 40 MIN: CPT

## 2018-05-08 ENCOUNTER — APPOINTMENT (OUTPATIENT)
Dept: INTERNAL MEDICINE | Facility: CLINIC | Age: 68
End: 2018-05-08
Payer: MEDICARE

## 2018-05-08 VITALS
DIASTOLIC BLOOD PRESSURE: 83 MMHG | OXYGEN SATURATION: 99 % | SYSTOLIC BLOOD PRESSURE: 135 MMHG | TEMPERATURE: 97.8 F | BODY MASS INDEX: 20.41 KG/M2 | WEIGHT: 108 LBS | HEART RATE: 66 BPM

## 2018-05-08 DIAGNOSIS — F17.200 NICOTINE DEPENDENCE, UNSPECIFIED, UNCOMPLICATED: ICD-10-CM

## 2018-05-08 LAB — CYTOLOGY CVX/VAG DOC THIN PREP: NORMAL

## 2018-05-08 PROCEDURE — 99213 OFFICE O/P EST LOW 20 MIN: CPT | Mod: GE

## 2018-06-07 ENCOUNTER — APPOINTMENT (OUTPATIENT)
Dept: ORTHOPEDIC SURGERY | Facility: CLINIC | Age: 68
End: 2018-06-07
Payer: MEDICARE

## 2018-06-07 VITALS
BODY MASS INDEX: 20.66 KG/M2 | HEART RATE: 81 BPM | DIASTOLIC BLOOD PRESSURE: 60 MMHG | HEIGHT: 60.63 IN | WEIGHT: 108 LBS | SYSTOLIC BLOOD PRESSURE: 100 MMHG | OXYGEN SATURATION: 98 %

## 2018-06-07 PROCEDURE — 99214 OFFICE O/P EST MOD 30 MIN: CPT

## 2018-06-19 ENCOUNTER — FORM ENCOUNTER (OUTPATIENT)
Age: 68
End: 2018-06-19

## 2018-06-19 ENCOUNTER — APPOINTMENT (OUTPATIENT)
Dept: INTERNAL MEDICINE | Facility: CLINIC | Age: 68
End: 2018-06-19
Payer: MEDICARE

## 2018-06-19 VITALS
TEMPERATURE: 97.7 F | HEIGHT: 60 IN | WEIGHT: 107 LBS | SYSTOLIC BLOOD PRESSURE: 130 MMHG | OXYGEN SATURATION: 98 % | BODY MASS INDEX: 21.01 KG/M2 | HEART RATE: 64 BPM | DIASTOLIC BLOOD PRESSURE: 68 MMHG

## 2018-06-19 PROCEDURE — 99213 OFFICE O/P EST LOW 20 MIN: CPT | Mod: GC

## 2018-06-20 ENCOUNTER — OUTPATIENT (OUTPATIENT)
Dept: OUTPATIENT SERVICES | Facility: HOSPITAL | Age: 68
LOS: 1 days | End: 2018-06-20
Payer: MEDICARE

## 2018-06-20 ENCOUNTER — APPOINTMENT (OUTPATIENT)
Dept: MAMMOGRAPHY | Facility: HOSPITAL | Age: 68
End: 2018-06-20
Payer: MEDICARE

## 2018-06-20 PROCEDURE — 77063 BREAST TOMOSYNTHESIS BI: CPT | Mod: 26

## 2018-06-20 PROCEDURE — 77063 BREAST TOMOSYNTHESIS BI: CPT

## 2018-06-20 PROCEDURE — 77067 SCR MAMMO BI INCL CAD: CPT | Mod: 26

## 2018-06-20 PROCEDURE — 77067 SCR MAMMO BI INCL CAD: CPT

## 2018-06-21 ENCOUNTER — OTHER (OUTPATIENT)
Age: 68
End: 2018-06-21

## 2018-06-21 DIAGNOSIS — N60.09 SOLITARY CYST OF UNSPECIFIED BREAST: ICD-10-CM

## 2018-06-25 ENCOUNTER — APPOINTMENT (OUTPATIENT)
Dept: OTOLARYNGOLOGY | Facility: CLINIC | Age: 68
End: 2018-06-25
Payer: MEDICARE

## 2018-06-25 VITALS
HEART RATE: 60 BPM | DIASTOLIC BLOOD PRESSURE: 77 MMHG | TEMPERATURE: 97.4 F | SYSTOLIC BLOOD PRESSURE: 156 MMHG | OXYGEN SATURATION: 99 %

## 2018-06-25 PROCEDURE — 92557 COMPREHENSIVE HEARING TEST: CPT

## 2018-06-25 PROCEDURE — 99213 OFFICE O/P EST LOW 20 MIN: CPT

## 2018-06-25 PROCEDURE — 92550 TYMPANOMETRY & REFLEX THRESH: CPT

## 2018-06-25 PROCEDURE — 31231 NASAL ENDOSCOPY DX: CPT

## 2018-07-08 ENCOUNTER — FORM ENCOUNTER (OUTPATIENT)
Age: 68
End: 2018-07-08

## 2018-07-09 ENCOUNTER — OUTPATIENT (OUTPATIENT)
Dept: OUTPATIENT SERVICES | Facility: HOSPITAL | Age: 68
LOS: 1 days | End: 2018-07-09
Payer: MEDICARE

## 2018-07-09 ENCOUNTER — APPOINTMENT (OUTPATIENT)
Dept: ULTRASOUND IMAGING | Facility: HOSPITAL | Age: 68
End: 2018-07-09
Payer: MEDICARE

## 2018-07-09 PROCEDURE — 76641 ULTRASOUND BREAST COMPLETE: CPT

## 2018-07-09 PROCEDURE — 76641 ULTRASOUND BREAST COMPLETE: CPT | Mod: 26,50

## 2018-07-10 ENCOUNTER — APPOINTMENT (OUTPATIENT)
Dept: OTOLARYNGOLOGY | Facility: CLINIC | Age: 68
End: 2018-07-10
Payer: MEDICARE

## 2018-07-10 PROCEDURE — 92557 COMPREHENSIVE HEARING TEST: CPT

## 2018-07-10 PROCEDURE — 99213 OFFICE O/P EST LOW 20 MIN: CPT

## 2018-07-10 PROCEDURE — 92550 TYMPANOMETRY & REFLEX THRESH: CPT

## 2018-07-10 RX ORDER — ASPIRIN 81 MG/1
81 TABLET ORAL DAILY
Qty: 30 | Refills: 3 | Status: COMPLETED | COMMUNITY
Start: 2017-10-20 | End: 2018-07-10

## 2018-07-17 ENCOUNTER — FORM ENCOUNTER (OUTPATIENT)
Age: 68
End: 2018-07-17

## 2018-07-17 PROBLEM — E78.00 PURE HYPERCHOLESTEROLEMIA, UNSPECIFIED: Chronic | Status: ACTIVE | Noted: 2017-04-03

## 2018-07-17 PROBLEM — I10 ESSENTIAL (PRIMARY) HYPERTENSION: Chronic | Status: ACTIVE | Noted: 2017-04-03

## 2018-07-18 ENCOUNTER — OUTPATIENT (OUTPATIENT)
Dept: OUTPATIENT SERVICES | Facility: HOSPITAL | Age: 68
LOS: 1 days | End: 2018-07-18
Payer: MEDICARE

## 2018-07-18 ENCOUNTER — APPOINTMENT (OUTPATIENT)
Dept: ULTRASOUND IMAGING | Facility: HOSPITAL | Age: 68
End: 2018-07-18
Payer: MEDICARE

## 2018-07-18 ENCOUNTER — RESULT REVIEW (OUTPATIENT)
Age: 68
End: 2018-07-18

## 2018-07-18 PROCEDURE — 88305 TISSUE EXAM BY PATHOLOGIST: CPT

## 2018-07-18 PROCEDURE — 77065 DX MAMMO INCL CAD UNI: CPT

## 2018-07-18 PROCEDURE — A4648: CPT

## 2018-07-18 PROCEDURE — 19083 BX BREAST 1ST LESION US IMAG: CPT | Mod: RT

## 2018-07-18 PROCEDURE — 77065 DX MAMMO INCL CAD UNI: CPT | Mod: 26,RT

## 2018-07-18 PROCEDURE — 19083 BX BREAST 1ST LESION US IMAG: CPT

## 2018-07-20 LAB — SURGICAL PATHOLOGY STUDY: SIGNIFICANT CHANGE UP

## 2018-07-24 ENCOUNTER — APPOINTMENT (OUTPATIENT)
Dept: OTOLARYNGOLOGY | Facility: CLINIC | Age: 68
End: 2018-07-24
Payer: MEDICARE

## 2018-07-24 VITALS
DIASTOLIC BLOOD PRESSURE: 75 MMHG | HEART RATE: 78 BPM | WEIGHT: 107 LBS | HEIGHT: 62 IN | BODY MASS INDEX: 19.69 KG/M2 | SYSTOLIC BLOOD PRESSURE: 138 MMHG

## 2018-07-24 DIAGNOSIS — Z86.39 PERSONAL HISTORY OF OTHER ENDOCRINE, NUTRITIONAL AND METABOLIC DISEASE: ICD-10-CM

## 2018-07-24 DIAGNOSIS — Z86.79 PERSONAL HISTORY OF OTHER DISEASES OF THE CIRCULATORY SYSTEM: ICD-10-CM

## 2018-07-24 DIAGNOSIS — Z82.49 FAMILY HISTORY OF ISCHEMIC HEART DISEASE AND OTHER DISEASES OF THE CIRCULATORY SYSTEM: ICD-10-CM

## 2018-07-24 PROCEDURE — 99215 OFFICE O/P EST HI 40 MIN: CPT

## 2018-07-24 PROCEDURE — 92550 TYMPANOMETRY & REFLEX THRESH: CPT

## 2018-07-24 PROCEDURE — 92557 COMPREHENSIVE HEARING TEST: CPT

## 2018-07-26 PROBLEM — Z86.39 HISTORY OF HIGH CHOLESTEROL: Status: RESOLVED | Noted: 2017-11-28 | Resolved: 2018-07-26

## 2018-07-26 PROBLEM — Z82.49 FAMILY HISTORY OF PULMONARY EMBOLISM: Status: ACTIVE | Noted: 2017-11-28

## 2018-07-26 PROBLEM — Z86.79 HISTORY OF HYPERTENSION: Status: RESOLVED | Noted: 2017-11-28 | Resolved: 2018-07-26

## 2018-07-30 ENCOUNTER — APPOINTMENT (OUTPATIENT)
Dept: MRI IMAGING | Facility: HOSPITAL | Age: 68
End: 2018-07-30
Payer: MEDICARE

## 2018-07-30 ENCOUNTER — OUTPATIENT (OUTPATIENT)
Dept: OUTPATIENT SERVICES | Facility: HOSPITAL | Age: 68
LOS: 1 days | End: 2018-07-30
Payer: MEDICARE

## 2018-07-30 PROCEDURE — 70553 MRI BRAIN STEM W/O & W/DYE: CPT

## 2018-07-30 PROCEDURE — A9585: CPT

## 2018-07-30 PROCEDURE — 70553 MRI BRAIN STEM W/O & W/DYE: CPT | Mod: 26

## 2018-07-31 ENCOUNTER — APPOINTMENT (OUTPATIENT)
Dept: INTERNAL MEDICINE | Facility: CLINIC | Age: 68
End: 2018-07-31
Payer: MEDICARE

## 2018-07-31 VITALS
HEART RATE: 68 BPM | SYSTOLIC BLOOD PRESSURE: 120 MMHG | BODY MASS INDEX: 19.51 KG/M2 | OXYGEN SATURATION: 98 % | DIASTOLIC BLOOD PRESSURE: 66 MMHG | TEMPERATURE: 97.7 F | WEIGHT: 106 LBS | HEIGHT: 62 IN

## 2018-07-31 DIAGNOSIS — Z96.5: ICD-10-CM

## 2018-07-31 PROCEDURE — 99214 OFFICE O/P EST MOD 30 MIN: CPT | Mod: GC

## 2018-08-13 ENCOUNTER — RX RENEWAL (OUTPATIENT)
Age: 68
End: 2018-08-13

## 2018-08-14 ENCOUNTER — APPOINTMENT (OUTPATIENT)
Dept: OTOLARYNGOLOGY | Facility: CLINIC | Age: 68
End: 2018-08-14
Payer: MEDICARE

## 2018-08-14 VITALS
HEART RATE: 62 BPM | BODY MASS INDEX: 19.51 KG/M2 | WEIGHT: 106 LBS | DIASTOLIC BLOOD PRESSURE: 61 MMHG | SYSTOLIC BLOOD PRESSURE: 145 MMHG | HEIGHT: 62 IN

## 2018-08-14 DIAGNOSIS — H91.91 UNSPECIFIED HEARING LOSS, RIGHT EAR: ICD-10-CM

## 2018-08-14 PROCEDURE — 99214 OFFICE O/P EST MOD 30 MIN: CPT

## 2018-10-05 ENCOUNTER — APPOINTMENT (OUTPATIENT)
Dept: INTERNAL MEDICINE | Facility: CLINIC | Age: 68
End: 2018-10-05
Payer: MEDICARE

## 2018-10-05 VITALS
OXYGEN SATURATION: 100 % | SYSTOLIC BLOOD PRESSURE: 125 MMHG | TEMPERATURE: 97.7 F | BODY MASS INDEX: 20.06 KG/M2 | HEIGHT: 62 IN | WEIGHT: 109 LBS | DIASTOLIC BLOOD PRESSURE: 70 MMHG | HEART RATE: 58 BPM

## 2018-10-05 PROCEDURE — 99214 OFFICE O/P EST MOD 30 MIN: CPT | Mod: GC

## 2018-11-21 ENCOUNTER — APPOINTMENT (OUTPATIENT)
Dept: INTERNAL MEDICINE | Facility: CLINIC | Age: 68
End: 2018-11-21
Payer: MEDICARE

## 2018-11-21 VITALS
TEMPERATURE: 97.5 F | DIASTOLIC BLOOD PRESSURE: 73 MMHG | WEIGHT: 109 LBS | HEART RATE: 64 BPM | HEIGHT: 62 IN | OXYGEN SATURATION: 99 % | BODY MASS INDEX: 20.06 KG/M2 | SYSTOLIC BLOOD PRESSURE: 122 MMHG

## 2018-11-21 PROCEDURE — 99214 OFFICE O/P EST MOD 30 MIN: CPT | Mod: 25,GC

## 2018-11-21 PROCEDURE — 36415 COLL VENOUS BLD VENIPUNCTURE: CPT

## 2018-11-21 NOTE — END OF VISIT
[] : Resident [FreeTextEntry3] : limb spasm/cramps:  \par DDX: electrolyte issues, statin side effect (possible but less likely, since poor compliance), less likely CVA (no weakness)

## 2018-11-21 NOTE — PHYSICAL EXAM
[No Acute Distress] : no acute distress [Well Nourished] : well nourished [Well Developed] : well developed [Well-Appearing] : well-appearing [Normal Sclera/Conjunctiva] : normal sclera/conjunctiva [PERRL] : pupils equal round and reactive to light [EOMI] : extraocular movements intact [Normal Outer Ear/Nose] : the outer ears and nose were normal in appearance [No Respiratory Distress] : no respiratory distress  [Clear to Auscultation] : lungs were clear to auscultation bilaterally [No Accessory Muscle Use] : no accessory muscle use [Normal Rate] : normal rate  [Regular Rhythm] : with a regular rhythm [Normal S1, S2] : normal S1 and S2 [No Murmur] : no murmur heard [No Edema] : there was no peripheral edema [Soft] : abdomen soft [Non Tender] : non-tender [Non-distended] : non-distended [No Joint Swelling] : no joint swelling [Grossly Normal Strength/Tone] : grossly normal strength/tone [No Rash] : no rash [Normal Gait] : normal gait [Coordination Grossly Intact] : coordination grossly intact [No Focal Deficits] : no focal deficits [Normal Affect] : the affect was normal [Alert and Oriented x3] : oriented to person, place, and time [Normal Insight/Judgement] : insight and judgment were intact [de-identified] : thin

## 2018-11-21 NOTE — HISTORY OF PRESENT ILLNESS
[FreeTextEntry8] : Ms. Fernandez is a 69 yo female with a PMH HTN, HLD, osteoporosis, anxiety and sensorineural hearing loss who presents with a request to have labs drawn 2/2 to a few episodes of L arm and L leg spasm over the past 2 weeks. Leg spasms are intermittent and unprovoked; she is unsure of any exacerbating or alleviating factors. The spasms last a few a few seconds and then resolve. She has not had these sx in the past. She has been prescribed atorvastatin but has been using it intermittently for the past few months as she is concerned about side effects. She has a hx of osteoporosis and has not been using her prescribed Forteo as she does not like giving herself injections. She denies any changes in her health, and has been taking her lisinopril and ASA daily as prescribed. She denies HA, CP, SOB, n/v, changes in urinary or bowel habits. \par

## 2018-11-21 NOTE — ASSESSMENT
[FreeTextEntry1] : 69 yo female with a PMH HTN, HLD, osteoporosis, anxiety and sensorineural hearing loss who presents with a request to have labs drawn 2/2 to a few episodes of L arm and L leg spasm over the past 2 weeks. \par \par #Leg and arm spasm\par -Pt presents with unprovoked, intermittent episodes of left arm and leg spasms over a 2 week period. No recent changes in health, activity or medications. Pt not dry on exam but states that she does not often drink water. DDx includes electrolyte imbalance, calcium deficiency and less likely medication side effect. Pt has been taking atorvastatin intermittently so sx less likely myalgia or related to her medication. Last lipid panel March 2018.\par -ordered CBC, BMP, lipid panel\par \par #HCM\par Pt due for Hepatitis C screening.\par -Hep C Ab ordered\par Pt due for flu shot but is not yet ready to get the vaccine; she will consider getting it at her next visit.

## 2018-11-27 LAB
ANION GAP SERPL CALC-SCNC: 14 MMOL/L
BASOPHILS # BLD AUTO: 0.03 K/UL
BASOPHILS NFR BLD AUTO: 0.4 %
BUN SERPL-MCNC: 19 MG/DL
CALCIUM SERPL-MCNC: 10 MG/DL
CHLORIDE SERPL-SCNC: 106 MMOL/L
CHOLEST SERPL-MCNC: 279 MG/DL
CHOLEST/HDLC SERPL: 3.1 RATIO
CO2 SERPL-SCNC: 20 MMOL/L
CREAT SERPL-MCNC: 0.89 MG/DL
EOSINOPHIL # BLD AUTO: 0.07 K/UL
EOSINOPHIL NFR BLD AUTO: 0.9 %
GLUCOSE SERPL-MCNC: 92 MG/DL
HCT VFR BLD CALC: 43.8 %
HCV AB SER QL: NONREACTIVE
HCV S/CO RATIO: 0.18 S/CO
HDLC SERPL-MCNC: 90 MG/DL
HGB BLD-MCNC: 14.6 G/DL
IMM GRANULOCYTES NFR BLD AUTO: 0.1 %
LDLC SERPL CALC-MCNC: 116 MG/DL
LYMPHOCYTES # BLD AUTO: 1.98 K/UL
LYMPHOCYTES NFR BLD AUTO: 24.8 %
MAN DIFF?: NORMAL
MCHC RBC-ENTMCNC: 33.3 GM/DL
MCHC RBC-ENTMCNC: 33.6 PG
MCV RBC AUTO: 100.9 FL
MONOCYTES # BLD AUTO: 0.41 K/UL
MONOCYTES NFR BLD AUTO: 5.1 %
NEUTROPHILS # BLD AUTO: 5.47 K/UL
NEUTROPHILS NFR BLD AUTO: 68.7 %
PLATELET # BLD AUTO: 284 K/UL
POTASSIUM SERPL-SCNC: 4.9 MMOL/L
RBC # BLD: 4.34 M/UL
RBC # FLD: 13.8 %
SODIUM SERPL-SCNC: 140 MMOL/L
TRIGL SERPL-MCNC: 365 MG/DL
WBC # FLD AUTO: 7.97 K/UL

## 2018-12-03 ENCOUNTER — APPOINTMENT (OUTPATIENT)
Dept: RHEUMATOLOGY | Facility: CLINIC | Age: 68
End: 2018-12-03
Payer: MEDICARE

## 2018-12-03 ENCOUNTER — OUTPATIENT (OUTPATIENT)
Dept: OUTPATIENT SERVICES | Facility: HOSPITAL | Age: 68
LOS: 1 days | End: 2018-12-03
Payer: MEDICARE

## 2018-12-03 VITALS
TEMPERATURE: 98.5 F | HEIGHT: 62 IN | WEIGHT: 109 LBS | DIASTOLIC BLOOD PRESSURE: 80 MMHG | BODY MASS INDEX: 20.06 KG/M2 | SYSTOLIC BLOOD PRESSURE: 123 MMHG | HEART RATE: 62 BPM | OXYGEN SATURATION: 99 %

## 2018-12-03 PROCEDURE — 36415 COLL VENOUS BLD VENIPUNCTURE: CPT

## 2018-12-03 PROCEDURE — 72080 X-RAY EXAM THORACOLMB 2/> VW: CPT | Mod: 26

## 2018-12-03 PROCEDURE — 72080 X-RAY EXAM THORACOLMB 2/> VW: CPT

## 2018-12-03 PROCEDURE — 99205 OFFICE O/P NEW HI 60 MIN: CPT | Mod: 25

## 2018-12-05 LAB
25(OH)D3 SERPL-MCNC: 33.1 NG/ML
ALBUMIN SERPL ELPH-MCNC: 4.6 G/DL
ALP BLD-CCNC: 82 U/L
ALT SERPL-CCNC: 20 U/L
ANION GAP SERPL CALC-SCNC: 12 MMOL/L
APPEARANCE: CLEAR
AST SERPL-CCNC: 30 U/L
BACTERIA: NEGATIVE
BASOPHILS # BLD AUTO: 0.01 K/UL
BASOPHILS NFR BLD AUTO: 0.1 %
BILIRUB SERPL-MCNC: 0.3 MG/DL
BILIRUBIN URINE: NEGATIVE
BLOOD URINE: NEGATIVE
BUN SERPL-MCNC: 14 MG/DL
CALCIUM ?TM UR-MCNC: 13.9 MG/DL
CALCIUM SERPL-MCNC: 10.2 MG/DL
CALCIUM SERPL-MCNC: 10.2 MG/DL
CHLORIDE SERPL-SCNC: 102 MMOL/L
CO2 SERPL-SCNC: 23 MMOL/L
COLOR: YELLOW
CREAT SERPL-MCNC: 0.75 MG/DL
CREAT SPEC-SCNC: 42 MG/DL
CRP SERPL-MCNC: <0.1 MG/DL
EOSINOPHIL # BLD AUTO: 0.09 K/UL
EOSINOPHIL NFR BLD AUTO: 1.2 %
ERYTHROCYTE [SEDIMENTATION RATE] IN BLOOD BY WESTERGREN METHOD: 15 MM/HR
GLUCOSE QUALITATIVE U: NEGATIVE MG/DL
GLUCOSE SERPL-MCNC: 91 MG/DL
HCT VFR BLD CALC: 43.4 %
HGB BLD-MCNC: 14 G/DL
HYALINE CASTS: 0 /LPF
IMM GRANULOCYTES NFR BLD AUTO: 0.1 %
KETONES URINE: NEGATIVE
LEUKOCYTE ESTERASE URINE: NEGATIVE
LYMPHOCYTES # BLD AUTO: 2.21 K/UL
LYMPHOCYTES NFR BLD AUTO: 29.8 %
MAGNESIUM SERPL-MCNC: 2.3 MG/DL
MAN DIFF?: NORMAL
MCHC RBC-ENTMCNC: 31.8 PG
MCHC RBC-ENTMCNC: 32.3 GM/DL
MCV RBC AUTO: 98.6 FL
MICROSCOPIC-UA: NORMAL
MONOCYTES # BLD AUTO: 0.41 K/UL
MONOCYTES NFR BLD AUTO: 5.5 %
NEUTROPHILS # BLD AUTO: 4.69 K/UL
NEUTROPHILS NFR BLD AUTO: 63.3 %
NITRITE URINE: NEGATIVE
PARATHYROID HORMONE INTACT: 55 PG/ML
PH URINE: 7
PHOSPHATE SERPL-MCNC: 4.4 MG/DL
PLATELET # BLD AUTO: 314 K/UL
POTASSIUM SERPL-SCNC: 4.7 MMOL/L
PROT SERPL-MCNC: 7.3 G/DL
PROTEIN URINE: NEGATIVE MG/DL
RBC # BLD: 4.4 M/UL
RBC # FLD: 13.5 %
RED BLOOD CELLS URINE: 2 /HPF
SODIUM SERPL-SCNC: 137 MMOL/L
SPECIFIC GRAVITY URINE: 1.01
SQUAMOUS EPITHELIAL CELLS: 2 /HPF
TSH SERPL-ACNC: 2.16 UIU/ML
UROBILINOGEN URINE: NEGATIVE MG/DL
WBC # FLD AUTO: 7.42 K/UL
WHITE BLOOD CELLS URINE: 1 /HPF

## 2018-12-06 ENCOUNTER — APPOINTMENT (OUTPATIENT)
Dept: RADIOLOGY | Facility: HOSPITAL | Age: 68
End: 2018-12-06
Payer: MEDICARE

## 2018-12-06 ENCOUNTER — OUTPATIENT (OUTPATIENT)
Dept: OUTPATIENT SERVICES | Facility: HOSPITAL | Age: 68
LOS: 1 days | End: 2018-12-06
Payer: MEDICARE

## 2018-12-06 PROCEDURE — 77080 DXA BONE DENSITY AXIAL: CPT

## 2018-12-06 PROCEDURE — 77080 DXA BONE DENSITY AXIAL: CPT | Mod: 26

## 2019-01-10 ENCOUNTER — APPOINTMENT (OUTPATIENT)
Dept: RHEUMATOLOGY | Facility: CLINIC | Age: 69
End: 2019-01-10
Payer: MEDICARE

## 2019-01-10 VITALS
DIASTOLIC BLOOD PRESSURE: 84 MMHG | BODY MASS INDEX: 20.06 KG/M2 | HEIGHT: 62 IN | TEMPERATURE: 98.7 F | OXYGEN SATURATION: 98 % | WEIGHT: 109 LBS | SYSTOLIC BLOOD PRESSURE: 151 MMHG | HEART RATE: 70 BPM

## 2019-01-10 PROCEDURE — 99214 OFFICE O/P EST MOD 30 MIN: CPT

## 2019-01-10 NOTE — ASSESSMENT
[FreeTextEntry1] : 68 year old female presents for evaluation of osteoporosis and arthritis\par Patient needs to schedule reclast, it has been approved\par Contact PCP for evaluation of flu like symptoms \par Exercise\par Ca/Vit D

## 2019-01-10 NOTE — HISTORY OF PRESENT ILLNESS
[FreeTextEntry1] : Initial Visit:\jung Does not want to take Forteo\par Has never been on any other medications for her bones, though she takes ca/vit d \par Last DEXA 2016 with osteoporosis \par Had a radial wrist fracture in 2017 - fell from a low bench \par Suffered a fall two weeks ago, notes mid back pain since she fell \par Has been taking Tylonel OTC since \par No joint swelling or morning stiffness\par Eats calcium and takes supplements. Takes vitamin d 1000IU daily. \par Has dental implants\par Menopause at age 52\par Plan: Patient with a history of osteoporosis and a radial wrist fracture. \par Needs treatment. Will re check DEXA. Continue ca/vit D\par XR T/L spine in light of fall and back pain.  [Weight Loss] : no weight loss [Fever] : no fever [Chills] : no chills [Malar Facial Rash] : no malar facial rash [Skin Lesions] : no lesions [Skin Nodules] : no skin nodules [Oral Ulcers] : no oral ulcers [Cough] : no cough [Dry Mouth] : no dry mouth [Shortness of Breath] : no shortness of breath [Chest Pain] : no chest pain [Falls] : no falls [Difficulty Standing] : no difficulty standing [Difficulty Walking] : no difficulty walking [Myalgias] : no myalgias [Muscle Weakness] : no muscle weakness [Muscle Spasms] : no muscle spasms [Muscle Cramping] : no muscle cramping [Visual Changes] : no visual changes [Eye Pain] : no eye pain [Eye Redness] : no eye redness [Dry Eyes] : no dry eyes

## 2019-01-10 NOTE — PHYSICAL EXAM
[General Appearance - Alert] : alert [General Appearance - In No Acute Distress] : in no acute distress [General Appearance - Well Nourished] : well nourished [General Appearance - Well Developed] : well developed [General Appearance - Well-Appearing] : healthy appearing [Sclera] : the sclera and conjunctiva were normal [Outer Ear] : the ears and nose were normal in appearance [Examination Of The Oral Cavity] : the lips and gums were normal [Nasal Cavity] : the nasal mucosa and septum were normal [Auscultation Breath Sounds / Voice Sounds] : lungs were clear to auscultation bilaterally [Heart Rate And Rhythm] : heart rate was normal and rhythm regular [Heart Sounds] : normal S1 and S2 [Edema] : there was no peripheral edema [Abnormal Walk] : normal gait [Musculoskeletal - Swelling] : no joint swelling seen [Motor Tone] : muscle strength and tone were normal [] : no rash [Sensation] : the sensory exam was normal to light touch and pinprick [Motor Exam] : the motor exam was normal [Oriented To Time, Place, And Person] : oriented to person, place, and time [FreeTextEntry1] : no tenderness in T spine

## 2019-01-17 ENCOUNTER — MEDICATION RENEWAL (OUTPATIENT)
Age: 69
End: 2019-01-17

## 2019-01-25 ENCOUNTER — APPOINTMENT (OUTPATIENT)
Dept: INTERNAL MEDICINE | Facility: CLINIC | Age: 69
End: 2019-01-25
Payer: MEDICARE

## 2019-01-25 VITALS
SYSTOLIC BLOOD PRESSURE: 125 MMHG | OXYGEN SATURATION: 99 % | TEMPERATURE: 97.9 F | RESPIRATION RATE: 18 BRPM | HEART RATE: 62 BPM | DIASTOLIC BLOOD PRESSURE: 78 MMHG

## 2019-01-25 DIAGNOSIS — R05 COUGH: ICD-10-CM

## 2019-01-25 PROCEDURE — 99214 OFFICE O/P EST MOD 30 MIN: CPT | Mod: GC,25

## 2019-01-25 PROCEDURE — 86580 TB INTRADERMAL TEST: CPT

## 2019-01-25 NOTE — ASSESSMENT
[FreeTextEntry1] : 69 yo female with a PMH HTN, HLD, osteoporosis, anxiety and sensorineural hearing loss who presents with cough x 1 month.\par \par 1) Asthmatic component of URI\par Pt presents with cough x 1 month with mild wheezing and rhonchi on physical exam. Cough does not seem to be cardiac or bacterial in nature at this time. Peak flow performed in clinic 250 (poor effort)\par - Albuterol for bronchoconstriction \par - CXR and PPD (pt born in [place] and having night sweats). TB is unlikely but pt has never been tested \par -  Robitussin OTC \par - c/w with peak flow at home\par - return to clinic next week to check PPD and Peak Flow \par \par 2) HTN\par -Patient with a history of HTN, well controlled with BP at this visit 125/78, continue lisinopril 5mg daily.\par \par 3) HLD\par -Patient with a history of hyperlipidemia, continue lipitor 20mg daily.\par \par 4) Sensorineural hearing loss\par -Patient with R sided hearing loss for 4-5 months, saw ENT and was on steroids which has not helped. Was given neurology referral during previous visits \par \par 5) HCM\par Pt amenable to getting flu shot, and prevnar/pneumovax next week if feels betteer\par UTD on TDap\par f/u PPD\par Will discuss Mammogram and Colonoscopy at next visit. \par

## 2019-01-25 NOTE — PHYSICAL EXAM
[No Acute Distress] : no acute distress [Well Nourished] : well nourished [Well Developed] : well developed [Well-Appearing] : well-appearing [Normal Sclera/Conjunctiva] : normal sclera/conjunctiva [PERRL] : pupils equal round and reactive to light [EOMI] : extraocular movements intact [Normal Outer Ear/Nose] : the outer ears and nose were normal in appearance [Normal Oropharynx] : the oropharynx was normal [No JVD] : no jugular venous distention [Supple] : supple [No Lymphadenopathy] : no lymphadenopathy [No Respiratory Distress] : no respiratory distress  [No Accessory Muscle Use] : no accessory muscle use [Normal Rate] : normal rate  [Regular Rhythm] : with a regular rhythm [Normal S1, S2] : normal S1 and S2 [No Murmur] : no murmur heard [No Carotid Bruits] : no carotid bruits [No Varicosities] : no varicosities [No Edema] : there was no peripheral edema [No Extremity Clubbing/Cyanosis] : no extremity clubbing/cyanosis [Soft] : abdomen soft [Non-distended] : non-distended [Normal Bowel Sounds] : normal bowel sounds [No CVA Tenderness] : no CVA  tenderness [No Spinal Tenderness] : no spinal tenderness [No Joint Swelling] : no joint swelling [Grossly Normal Strength/Tone] : grossly normal strength/tone [No Rash] : no rash [Normal Gait] : normal gait [Coordination Grossly Intact] : coordination grossly intact [No Focal Deficits] : no focal deficits [Normal Affect] : the affect was normal [Normal Insight/Judgement] : insight and judgment were intact [de-identified] : mild wheezing and ronchi in the upper lung fields  [de-identified] : mild generalzied tenderness upon palpation of both R and L lower quadrants

## 2019-01-25 NOTE — HISTORY OF PRESENT ILLNESS
[FreeTextEntry1] : Cough x 1 month  [de-identified] : 67 yo female with a PMH HTN, HLD, osteoporosis, anxiety and sensorineural hearing loss who presents with cough x 1 month. Pt states in the beginning of the month she was having a productive cough with yellow sputum. Pt was taking Mucine and Contac OTC with resolution of sputum production but continues to have cough. Pt also endorses fatigue and night sweats which is new. Pt does not note any aggravating symptoms such as positional or nocturnal exacerbation. Of note pt denies any sick contacts or recent travel. Pt denies subjective F, chills, CP, Palpitations, headache, decreased appetite, or weight loss.

## 2019-01-25 NOTE — END OF VISIT
[] : Resident [FreeTextEntry3] : 68 year old female - complaints of one month of persistent cough, night sweats- these symptoms occurred and persist after URI symptoms one month ago. Cough did not resolve. Feels fatigued.\par denies change in appetite - not a good eater at baseline \par non toxic in appearance, well groomed\par few expiratory wheezes in right upper lobe posteriorly, some wheezes cleared with cough\par peak flow- poor effort approx 250\par plan- cough of unclear origin- post viral- trial of bronchodilator, monitor peak flow and call for follow up next week.\par night sweats- check CXR and PPD\par patient advised if symptoms do not improve to follow up next week - PPD placed to return on Monday as well as follow up with CXR - consider other etiologies if persist.\par health care maintenance reviewed

## 2019-01-25 NOTE — REVIEW OF SYSTEMS
[Fatigue] : fatigue [Night Sweats] : night sweats [Cough] : cough [Fever] : no fever [Chills] : no chills [Recent Change In Weight] : ~T no recent weight change [Discharge] : no discharge [Pain] : no pain [Vision Problems] : no vision problems [Earache] : no earache [Hearing Loss] : no hearing loss [Nasal Discharge] : no nasal discharge [Sore Throat] : no sore throat [Chest Pain] : no chest pain [Palpitations] : no palpitations [Orthopnea] : no orthopnea [Paroysmal Nocturnal Dyspnea] : no paroysmal nocturnal dyspnea [Shortness Of Breath] : no shortness of breath [Wheezing] : no wheezing [Abdominal Pain] : no abdominal pain [Nausea] : no nausea [Vomiting] : no vomiting [Heartburn] : no heartburn [Dysuria] : no dysuria [Incontinence] : no incontinence [Hematuria] : no hematuria [Joint Pain] : no joint pain [Muscle Pain] : no muscle pain [Itching] : no itching [Skin Rash] : no skin rash [Headache] : no headache [Dizziness] : no dizziness [Memory Loss] : no memory loss [Suicidal] : not suicidal [Insomnia] : no insomnia [Easy Bleeding] : no easy bleeding [Easy Bruising] : no easy bruising

## 2019-01-28 ENCOUNTER — APPOINTMENT (OUTPATIENT)
Dept: INTERNAL MEDICINE | Facility: CLINIC | Age: 69
End: 2019-01-28
Payer: MEDICARE

## 2019-01-28 ENCOUNTER — FORM ENCOUNTER (OUTPATIENT)
Age: 69
End: 2019-01-28

## 2019-01-28 PROCEDURE — ZZZZZ: CPT

## 2019-01-29 ENCOUNTER — OUTPATIENT (OUTPATIENT)
Dept: OUTPATIENT SERVICES | Facility: HOSPITAL | Age: 69
LOS: 1 days | End: 2019-01-29
Payer: MEDICARE

## 2019-01-29 PROCEDURE — 71045 X-RAY EXAM CHEST 1 VIEW: CPT

## 2019-01-29 PROCEDURE — 71045 X-RAY EXAM CHEST 1 VIEW: CPT | Mod: 26

## 2019-01-31 ENCOUNTER — OTHER (OUTPATIENT)
Age: 69
End: 2019-01-31

## 2019-02-01 ENCOUNTER — APPOINTMENT (OUTPATIENT)
Dept: INTERNAL MEDICINE | Facility: CLINIC | Age: 69
End: 2019-02-01
Payer: MEDICARE

## 2019-02-01 VITALS
BODY MASS INDEX: 20.06 KG/M2 | DIASTOLIC BLOOD PRESSURE: 75 MMHG | HEIGHT: 62 IN | SYSTOLIC BLOOD PRESSURE: 131 MMHG | OXYGEN SATURATION: 97 % | TEMPERATURE: 97.6 F | HEART RATE: 68 BPM | WEIGHT: 109 LBS

## 2019-02-01 PROCEDURE — 99213 OFFICE O/P EST LOW 20 MIN: CPT | Mod: GC

## 2019-02-01 NOTE — PHYSICAL EXAM
[No Acute Distress] : no acute distress [Well Nourished] : well nourished [Well Developed] : well developed [Well-Appearing] : well-appearing [Normal Sclera/Conjunctiva] : normal sclera/conjunctiva [PERRL] : pupils equal round and reactive to light [EOMI] : extraocular movements intact [Normal Outer Ear/Nose] : the outer ears and nose were normal in appearance [Normal Oropharynx] : the oropharynx was normal [No Respiratory Distress] : no respiratory distress  [Clear to Auscultation] : lungs were clear to auscultation bilaterally [No Accessory Muscle Use] : no accessory muscle use [Normal Rate] : normal rate  [Regular Rhythm] : with a regular rhythm [Normal S1, S2] : normal S1 and S2 [No Murmur] : no murmur heard [Soft] : abdomen soft [Non Tender] : non-tender [Non-distended] : non-distended [No Rash] : no rash [Normal Gait] : normal gait [Alert and Oriented x3] : oriented to person, place, and time

## 2019-02-01 NOTE — REVIEW OF SYSTEMS
[Fatigue] : fatigue [Cough] : cough [Fever] : no fever [Chills] : no chills [Discharge] : no discharge [Vision Problems] : no vision problems [Earache] : no earache [Nasal Discharge] : no nasal discharge [Sore Throat] : no sore throat [Chest Pain] : no chest pain [Orthopnea] : no orthopnea [Shortness Of Breath] : no shortness of breath [Wheezing] : no wheezing [Abdominal Pain] : no abdominal pain [Nausea] : no nausea [Vomiting] : no vomiting [Dysuria] : no dysuria [Hematuria] : no hematuria [Joint Pain] : no joint pain [Muscle Pain] : no muscle pain [Itching] : no itching [Skin Rash] : no skin rash [Headache] : no headache [Memory Loss] : no memory loss [FreeTextEntry2] : Endorses fatigue [FreeTextEntry6] : Endorses difficulty inhaling, dry cough

## 2019-02-01 NOTE — END OF VISIT
[FreeTextEntry3] : 67 yo female with hx HTN, HPL, osteoporosis, anxiety, here for f/u difficulty with inhalation described as "not getting full deep breath."\par \par well appearing\par \par 1) difficulty with inhalation - etiology  unclear, well appearing. normal xray. symptoms intermittent.  possibly due to cold vs. anxiety.  however given low peak flow, will refer to pulm for PFTs\par

## 2019-02-01 NOTE — ASSESSMENT
[FreeTextEntry1] : 68 year old female with PMH HTN, HLD, osteoporosis anxiety and sensorineural hearing loss who presents for follow up of shortness of breath.\par \par Dyspnea\par -Patient with difficulty inhaling for 2 months.  CXR clear, no fevers, cough is dry.  Less likely infection.  Proair inhaler not helping per patient.  Peak flows in 250s in office and at home.  Will refer patient to pulm for PFTs and evaluation.\par \par HTN\par -Patient with history of HTN, well controlled with BP today 131/75, continue lisinopril 5mg.\par \par HLD\par -Patient with history of hyperlipidemia, continue with lipitor 20mg daily

## 2019-02-01 NOTE — HISTORY OF PRESENT ILLNESS
[de-identified] : 68 year old female with PMH HTN, HLD, osteoporosis anxiety and sensorineural hearing loss who presents for follow up of shortness of breath.  Last week, the patient came to clinic complaining of 1 month of difficulty inhaling, cough, fatigue and night sweats.  She performed peak flow in the office and value was 250.  She had a CXR which was clear.  She was prescribed ProAir and she states it has not helped.  She states that when she breaths in, she feels like it's not all going in.  She denies any sick contacts. Her peak flow measurements at home have been 250.

## 2019-02-04 ENCOUNTER — APPOINTMENT (OUTPATIENT)
Dept: PULMONOLOGY | Facility: CLINIC | Age: 69
End: 2019-02-04
Payer: MEDICARE

## 2019-02-04 VITALS
SYSTOLIC BLOOD PRESSURE: 104 MMHG | HEART RATE: 67 BPM | DIASTOLIC BLOOD PRESSURE: 70 MMHG | WEIGHT: 109 LBS | TEMPERATURE: 98.4 F | BODY MASS INDEX: 20.06 KG/M2 | HEIGHT: 62 IN | OXYGEN SATURATION: 98 %

## 2019-02-04 PROCEDURE — 94010 BREATHING CAPACITY TEST: CPT

## 2019-02-04 PROCEDURE — 99204 OFFICE O/P NEW MOD 45 MIN: CPT | Mod: 25

## 2019-02-04 RX ORDER — METHYLPREDNISOLONE 4 MG/1
4 TABLET ORAL
Qty: 21 | Refills: 0 | Status: DISCONTINUED | COMMUNITY
Start: 2018-07-24 | End: 2019-02-04

## 2019-02-04 RX ORDER — METHYLPREDNISOLONE 4 MG/1
4 TABLET ORAL
Qty: 1 | Refills: 0 | Status: DISCONTINUED | COMMUNITY
Start: 2018-06-25 | End: 2019-02-04

## 2019-02-04 RX ORDER — METHYLPREDNISOLONE 4 MG/1
4 TABLET ORAL
Qty: 1 | Refills: 0 | Status: DISCONTINUED | COMMUNITY
Start: 2018-07-24 | End: 2019-02-04

## 2019-02-04 NOTE — HISTORY OF PRESENT ILLNESS
[FreeTextEntry1] : 2/4/19: Asked to evaluate patient by Dr Kwan for dyspnea. In the setting of having a cold, she developed a problem taking a deep breath. Did also have cough which has resolved, but the breathing problem persists. No history of any lung disease. Did have a CXR at [facility] which was clear. Quit smoking cigars 3 years ago, never cigarettes. Stopped exercising because of this illness, feels weak. Going on for 2 months. Feels lack of motivation. Feels like somebody is pushing on her chest. Trying to breathe deeply - feels like this right now.

## 2019-02-04 NOTE — CONSULT LETTER
[Dear  ___] : Dear  [unfilled], [( Thank you for referring [unfilled] for consultation for _____ )] : Thank you for referring [unfilled] for consultation for [unfilled] [Please see my note below.] : Please see my note below. [Consult Closing:] : Thank you very much for allowing me to participate in the care of this patient.  If you have any questions, please do not hesitate to contact me. [Sincerely,] : Sincerely, [FreeTextEntry2] : Jessie Kwan, DO\par [address] floor\[place] [address] [FreeTextEntry3] : Brooklyn Cooper MD, FCCP\par

## 2019-02-04 NOTE — PHYSICAL EXAM
[General Appearance - Well Developed] : well developed [General Appearance - Well Nourished] : well nourished [General Appearance - In No Acute Distress] : no acute distress [Normal Conjunctiva] : the conjunctiva exhibited no abnormalities [Normal Oropharynx] : normal oropharynx [FreeTextEntry1] : no LAD [Heart Rate And Rhythm] : heart rate and rhythm were normal [Heart Sounds] : normal S1 and S2 [Murmurs] : no murmurs present [Edema] : no peripheral edema present [Auscultation Breath Sounds / Voice Sounds] : lungs were clear to auscultation bilaterally [Bowel Sounds] : normal bowel sounds [Abdomen Soft] : soft [Abdomen Tenderness] : non-tender [Nail Clubbing] : no clubbing of the fingernails [Cyanosis, Localized] : no localized cyanosis [] : no rash [Affect] : the affect was normal

## 2019-02-04 NOTE — ASSESSMENT
[FreeTextEntry1] : Data reviewed:\par \par PA/lat CXR [facility] 02/01/2019 : clear lungs\par \par [place] 02/04/2019 : normal\par \par Impression:\par Dyspnea after a URI\par \par Plan:\par There is no evidence of any lung disease - this is functional. She is urged to resume exercising and normal activities. She is in agreement.

## 2019-02-05 ENCOUNTER — RX RENEWAL (OUTPATIENT)
Age: 69
End: 2019-02-05

## 2019-03-25 ENCOUNTER — RX RENEWAL (OUTPATIENT)
Age: 69
End: 2019-03-25

## 2019-04-10 ENCOUNTER — APPOINTMENT (OUTPATIENT)
Dept: INTERNAL MEDICINE | Facility: CLINIC | Age: 69
End: 2019-04-10

## 2019-04-10 VITALS
BODY MASS INDEX: 21.16 KG/M2 | SYSTOLIC BLOOD PRESSURE: 117 MMHG | OXYGEN SATURATION: 98 % | HEART RATE: 73 BPM | TEMPERATURE: 97.7 F | HEIGHT: 62 IN | DIASTOLIC BLOOD PRESSURE: 72 MMHG | WEIGHT: 115 LBS

## 2019-04-25 ENCOUNTER — APPOINTMENT (OUTPATIENT)
Dept: INTERNAL MEDICINE | Facility: CLINIC | Age: 69
End: 2019-04-25
Payer: MEDICARE

## 2019-04-25 VITALS — HEART RATE: 67 BPM | SYSTOLIC BLOOD PRESSURE: 148 MMHG | OXYGEN SATURATION: 98 % | DIASTOLIC BLOOD PRESSURE: 83 MMHG

## 2019-04-25 DIAGNOSIS — Z97.3 PRESENCE OF SPECTACLES AND CONTACT LENSES: ICD-10-CM

## 2019-04-25 PROCEDURE — G0009: CPT

## 2019-04-25 PROCEDURE — 99214 OFFICE O/P EST MOD 30 MIN: CPT | Mod: GC,25

## 2019-04-25 PROCEDURE — 90670 PCV13 VACCINE IM: CPT

## 2019-04-25 PROCEDURE — 36415 COLL VENOUS BLD VENIPUNCTURE: CPT

## 2019-04-25 RX ORDER — TERIPARATIDE 250 UG/ML
600 INJECTION, SOLUTION SUBCUTANEOUS DAILY
Qty: 1 | Refills: 11 | Status: DISCONTINUED | COMMUNITY
Start: 2018-05-15 | End: 2019-04-25

## 2019-04-25 RX ORDER — DICLOFENAC SODIUM 10 MG/G
1 GEL TOPICAL DAILY
Qty: 1 | Refills: 4 | Status: DISCONTINUED | COMMUNITY
Start: 2018-12-03 | End: 2019-04-25

## 2019-04-25 RX ORDER — AMMONIUM LACTATE 12 %
12 CREAM (GRAM) TOPICAL
Qty: 385 | Refills: 0 | Status: DISCONTINUED | COMMUNITY
Start: 2018-10-04 | End: 2019-04-25

## 2019-04-25 RX ORDER — CIPROFLOXACIN HYDROCHLORIDE 500 MG/1
500 TABLET, FILM COATED ORAL
Qty: 10 | Refills: 0 | Status: DISCONTINUED | COMMUNITY
Start: 2018-05-04 | End: 2019-04-25

## 2019-04-25 RX ORDER — TERCONAZOLE 4 MG/G
0.4 CREAM VAGINAL
Qty: 45 | Refills: 0 | Status: DISCONTINUED | COMMUNITY
Start: 2018-05-04 | End: 2019-04-25

## 2019-04-25 RX ORDER — AZITHROMYCIN 250 MG/1
250 TABLET, FILM COATED ORAL
Qty: 6 | Refills: 0 | Status: DISCONTINUED | COMMUNITY
Start: 2018-06-19 | End: 2019-04-25

## 2019-04-26 ENCOUNTER — OTHER (OUTPATIENT)
Age: 69
End: 2019-04-26

## 2019-04-26 LAB
CHOLEST SERPL-MCNC: 223 MG/DL
CHOLEST/HDLC SERPL: 3 RATIO
ESTIMATED AVERAGE GLUCOSE: 97 MG/DL
HBA1C MFR BLD HPLC: 5 %
HDLC SERPL-MCNC: 75 MG/DL
LDLC SERPL CALC-MCNC: 95 MG/DL
TRIGL SERPL-MCNC: 265 MG/DL

## 2019-04-26 NOTE — ASSESSMENT
[FreeTextEntry1] : 69 y/o F PMHx HTN, HLD, Osteoporosis, Anxiety, sensorineural hearing loss presenting for follow up and with complaints of cough for the last 2 weeks. \par \par #Viral URI\par Patient with complaints of productive cough, rhinorrhea and sneezing for the last couple of weeks. No reported history of seasonal allergies however is unclear. \par -supportive management with rest, IVF\par -Recommended a trial with anti-histamine including Zyrtec, Allegra or Claritin\par -Rx for Flonase sent to pharmacy\par \par #HTN\par Patient with history of HTN, /83 -->repeat /78. \par -Rx refill for Lisinopril sent to patient pharmacy\par -Encouraged to check BPs on home monitor and to bring in a log at next visit\par -Also endorses daily glass of wine, discussed with patient that EtOH may also contribute to elevated BPs, patient understands \par -HbA1c and Lipid Profile today\par \par #Osteoporosis\par Patient with Osteoporosis, recommended for Reclast in the past however patient refusing at this time and would like to pursue non-pharmacologic interventions at this time including exercise (daily) and Vit D supplementation\par \par #HLD\par -c/w home medication Lipitor\par \par #History of ASCUS\par History of ASCUS/ HPV negative on 2016 PAP, repeat PAP in 2018 negative\par -Gyn referral given today to establish care\par \par #Wears eyeglasses\par Lost eyeglasses recently\par -Optometry referral given today\par \par #Need for pneumococcal vaccination\par -Prevnar today\par \par #HCM\par -PAP smear - 4/10/2018 WNL (ASCUS, HPV negative in 2016)\par -Mammogram - 6/20/2018- WNL\par -Colonoscopy - 2014\par -DEXA - 12/6/2018\par -Influenza vaccination - deferred\par -Prevnar - today (s/p pneumovax 1/2018)\par -Shingrix - patient will discuss with insurance company regarding coverage\par \par RTC in 3 months to follow up with Dr Rojas \par

## 2019-04-26 NOTE — END OF VISIT
[FreeTextEntry3] : 67 yo female with hx HTN, HPL, sensorineural hearing loss here c/o URI and rhinorrhea x 1-2 weeks.  no fevers\par \par 1) URI symptoms - viral vs allergies.  recommend antihistamine.  flonase. fluids.  If symptoms persist or worsen, would consider tx for bacterial sinusitis.  \par 2) osteoporosis - chronic, saw endo however declines pharm tx.  c/w vit D.\par 3) HTN - chronic, controlled\par 4) HPL - check lipids, a1c

## 2019-04-26 NOTE — HISTORY OF PRESENT ILLNESS
[de-identified] : 67 y/o F PMHx HTN, HLD, Osteoporosis, Anxiety, sensorineural hearing loss presenting for follow up and with complaints of cough for the last 2 weeks. Patient states for the last couple of weeks she has been experiencing a productive cough with green sputum along with rhinorrhea and sneezing. Patient denies any recent sick contacts though she isn't sure as she is in contact with many people throughout the day (). Patient did not get the flu shot this year. Currently denying any fevers, chills, NS, chest pain, shortness of breath, GI upset including nausea/vomiting or myalgias. Remainder of ROS negative. \par \par Of note, her dyspnea she complained of in Feb 2019 has since resolved. She saw Pulmonology 2/4/2019 which reported her dyspnea to be functional rather than secondary to an underlying lung pathology (normal spirometry).\par \par Patient today is also requesting referrals to both Gynecology and optometry to establish care and for new Rx for eyeglasses as she recently lost her prescription pair. Of note, patient with abnormal PAP smear in 2016 demonstrating ASCUS/HPV negative with repeat PAP smear in 2018, normal.

## 2019-04-26 NOTE — PHYSICAL EXAM
[No Acute Distress] : no acute distress [Well Nourished] : well nourished [Well Developed] : well developed [Well-Appearing] : well-appearing [PERRL] : pupils equal round and reactive to light [Normal Sclera/Conjunctiva] : normal sclera/conjunctiva [Normal Outer Ear/Nose] : the outer ears and nose were normal in appearance [EOMI] : extraocular movements intact [Normal Oropharynx] : the oropharynx was normal [Normal TMs] : both tympanic membranes were normal [Supple] : supple [No JVD] : no jugular venous distention [No Lymphadenopathy] : no lymphadenopathy [Thyroid Normal, No Nodules] : the thyroid was normal and there were no nodules present [No Respiratory Distress] : no respiratory distress  [Clear to Auscultation] : lungs were clear to auscultation bilaterally [No Accessory Muscle Use] : no accessory muscle use [Normal Rate] : normal rate  [Regular Rhythm] : with a regular rhythm [Normal S1, S2] : normal S1 and S2 [No Carotid Bruits] : no carotid bruits [No Abdominal Bruit] : a ~M bruit was not heard ~T in the abdomen [No Varicosities] : no varicosities [Pedal Pulses Present] : the pedal pulses are present [No Edema] : there was no peripheral edema [No Extremity Clubbing/Cyanosis] : no extremity clubbing/cyanosis [Soft] : abdomen soft [No Palpable Aorta] : no palpable aorta [Non Tender] : non-tender [Non-distended] : non-distended [No Masses] : no abdominal mass palpated [No HSM] : no HSM [Normal Bowel Sounds] : normal bowel sounds [Normal Posterior Cervical Nodes] : no posterior cervical lymphadenopathy [Normal Anterior Cervical Nodes] : no anterior cervical lymphadenopathy [No CVA Tenderness] : no CVA  tenderness [No Spinal Tenderness] : no spinal tenderness [No Joint Swelling] : no joint swelling [Grossly Normal Strength/Tone] : grossly normal strength/tone [No Rash] : no rash [Normal Gait] : normal gait [Coordination Grossly Intact] : coordination grossly intact [No Focal Deficits] : no focal deficits [Deep Tendon Reflexes (DTR)] : deep tendon reflexes were 2+ and symmetric [Normal Affect] : the affect was normal [Normal Insight/Judgement] : insight and judgment were intact [de-identified] : +cerumen, no exudates [de-identified] : non-toxic appearing [de-identified] : +systolic murmurs

## 2019-05-21 ENCOUNTER — RX RENEWAL (OUTPATIENT)
Age: 69
End: 2019-05-21

## 2019-06-07 ENCOUNTER — APPOINTMENT (OUTPATIENT)
Dept: OPHTHALMOLOGY | Facility: CLINIC | Age: 69
End: 2019-06-07

## 2019-06-28 ENCOUNTER — APPOINTMENT (OUTPATIENT)
Dept: OBGYN | Facility: CLINIC | Age: 69
End: 2019-06-28

## 2019-07-12 ENCOUNTER — NON-APPOINTMENT (OUTPATIENT)
Age: 69
End: 2019-07-12

## 2019-07-12 ENCOUNTER — APPOINTMENT (OUTPATIENT)
Dept: OPHTHALMOLOGY | Facility: CLINIC | Age: 69
End: 2019-07-12
Payer: MEDICARE

## 2019-07-12 DIAGNOSIS — R73.09 OTHER ABNORMAL GLUCOSE: ICD-10-CM

## 2019-07-12 PROCEDURE — 92004 COMPRE OPH EXAM NEW PT 1/>: CPT

## 2019-07-12 PROCEDURE — 92015 DETERMINE REFRACTIVE STATE: CPT

## 2019-07-12 PROCEDURE — 92250 FUNDUS PHOTOGRAPHY W/I&R: CPT

## 2019-08-08 ENCOUNTER — NON-APPOINTMENT (OUTPATIENT)
Age: 69
End: 2019-08-08

## 2019-08-08 ENCOUNTER — APPOINTMENT (OUTPATIENT)
Dept: OPHTHALMOLOGY | Facility: CLINIC | Age: 69
End: 2019-08-08
Payer: MEDICARE

## 2019-08-08 PROCEDURE — 92133 CPTRZD OPH DX IMG PST SGM ON: CPT

## 2019-08-08 PROCEDURE — 92012 INTRM OPH EXAM EST PATIENT: CPT

## 2019-08-08 PROCEDURE — 92020 GONIOSCOPY: CPT

## 2019-08-08 PROCEDURE — 92083 EXTENDED VISUAL FIELD XM: CPT

## 2019-08-08 PROCEDURE — 76514 ECHO EXAM OF EYE THICKNESS: CPT

## 2019-08-14 ENCOUNTER — APPOINTMENT (OUTPATIENT)
Dept: OBGYN | Facility: CLINIC | Age: 69
End: 2019-08-14
Payer: MEDICARE

## 2019-08-14 VITALS
HEIGHT: 62 IN | DIASTOLIC BLOOD PRESSURE: 80 MMHG | SYSTOLIC BLOOD PRESSURE: 100 MMHG | WEIGHT: 112 LBS | BODY MASS INDEX: 20.61 KG/M2

## 2019-08-14 DIAGNOSIS — Z86.69 PERSONAL HISTORY OF OTHER DISEASES OF THE NERVOUS SYSTEM AND SENSE ORGANS: ICD-10-CM

## 2019-08-14 DIAGNOSIS — M54.5 LOW BACK PAIN: ICD-10-CM

## 2019-08-14 DIAGNOSIS — M25.569 PAIN IN UNSPECIFIED KNEE: ICD-10-CM

## 2019-08-14 DIAGNOSIS — H93.8X1 OTHER SPECIFIED DISORDERS OF RIGHT EAR: ICD-10-CM

## 2019-08-14 DIAGNOSIS — Z87.898 PERSONAL HISTORY OF OTHER SPECIFIED CONDITIONS: ICD-10-CM

## 2019-08-14 DIAGNOSIS — H92.09 OTALGIA, UNSPECIFIED EAR: ICD-10-CM

## 2019-08-14 DIAGNOSIS — Z87.19 PERSONAL HISTORY OF OTHER DISEASES OF THE DIGESTIVE SYSTEM: ICD-10-CM

## 2019-08-14 DIAGNOSIS — Z87.09 PERSONAL HISTORY OF OTHER DISEASES OF THE RESPIRATORY SYSTEM: ICD-10-CM

## 2019-08-14 DIAGNOSIS — H90.5 UNSPECIFIED SENSORINEURAL HEARING LOSS: ICD-10-CM

## 2019-08-14 DIAGNOSIS — M79.2 NEURALGIA AND NEURITIS, UNSPECIFIED: ICD-10-CM

## 2019-08-14 DIAGNOSIS — Z11.1 ENCOUNTER FOR SCREENING FOR RESPIRATORY TUBERCULOSIS: ICD-10-CM

## 2019-08-14 DIAGNOSIS — Z23 ENCOUNTER FOR IMMUNIZATION: ICD-10-CM

## 2019-08-14 DIAGNOSIS — R68.89 OTHER GENERAL SYMPTOMS AND SIGNS: ICD-10-CM

## 2019-08-14 DIAGNOSIS — Z12.39 ENCOUNTER FOR OTHER SCREENING FOR MALIGNANT NEOPLASM OF BREAST: ICD-10-CM

## 2019-08-14 DIAGNOSIS — Z01.00 ENCOUNTER FOR EXAMINATION OF EYES AND VISION W/OUT ABNORMAL FINDINGS: ICD-10-CM

## 2019-08-14 DIAGNOSIS — J06.9 ACUTE UPPER RESPIRATORY INFECTION, UNSPECIFIED: ICD-10-CM

## 2019-08-14 DIAGNOSIS — N89.8 OTHER SPECIFIED NONINFLAMMATORY DISORDERS OF VAGINA: ICD-10-CM

## 2019-08-14 DIAGNOSIS — Z12.4 ENCOUNTER FOR SCREENING FOR MALIGNANT NEOPLASM OF CERVIX: ICD-10-CM

## 2019-08-14 DIAGNOSIS — Z92.29 PERSONAL HISTORY OF OTHER DRUG THERAPY: ICD-10-CM

## 2019-08-14 DIAGNOSIS — Z11.59 ENCOUNTER FOR SCREENING FOR OTHER VIRAL DISEASES: ICD-10-CM

## 2019-08-14 DIAGNOSIS — M79.605 PAIN IN LEFT LEG: ICD-10-CM

## 2019-08-14 PROCEDURE — 99203 OFFICE O/P NEW LOW 30 MIN: CPT | Mod: 25

## 2019-08-14 PROCEDURE — G0101: CPT

## 2019-08-15 ENCOUNTER — APPOINTMENT (OUTPATIENT)
Dept: ANTEPARTUM | Facility: CLINIC | Age: 69
End: 2019-08-15
Payer: MEDICARE

## 2019-08-15 PROCEDURE — 76830 TRANSVAGINAL US NON-OB: CPT

## 2019-08-16 PROBLEM — Z86.69 HISTORY OF ACUTE OTITIS MEDIA: Status: RESOLVED | Noted: 2018-06-19 | Resolved: 2019-08-16

## 2019-08-16 PROBLEM — N89.8 VAGINAL DRYNESS: Status: ACTIVE | Noted: 2017-09-27

## 2019-08-16 PROBLEM — H92.09 EAR DISCOMFORT: Status: RESOLVED | Noted: 2018-06-21 | Resolved: 2019-08-16

## 2019-08-16 PROBLEM — Z23 NEED FOR PROPHYLACTIC VACCINATION WITH STREPTOCOCCUS PNEUMONIAE (PNEUMOCOCCUS) AND INFLUENZA VACCINES: Status: RESOLVED | Noted: 2018-01-10 | Resolved: 2019-08-16

## 2019-08-16 PROBLEM — R68.89 COLD INTOLERANCE: Status: RESOLVED | Noted: 2018-03-09 | Resolved: 2019-08-16

## 2019-08-16 PROBLEM — H90.5 SENSORY HEARING LOSS: Status: RESOLVED | Noted: 2018-07-10 | Resolved: 2019-08-16

## 2019-08-16 PROBLEM — M79.2 RADICULAR PAIN IN LEFT ARM: Status: RESOLVED | Noted: 2018-11-21 | Resolved: 2019-08-16

## 2019-08-16 PROBLEM — M79.605 LEFT LEG PAIN: Status: RESOLVED | Noted: 2018-11-21 | Resolved: 2019-08-16

## 2019-08-16 PROBLEM — M25.569 KNEE PAIN: Status: RESOLVED | Noted: 2017-01-05 | Resolved: 2019-08-16

## 2019-08-16 PROBLEM — Z87.898 HISTORY OF NIGHT SWEATS: Status: RESOLVED | Noted: 2019-01-25 | Resolved: 2019-08-16

## 2019-08-16 PROBLEM — Z11.1 SCREENING FOR TUBERCULOSIS: Status: RESOLVED | Noted: 2019-01-25 | Resolved: 2019-08-16

## 2019-08-16 PROBLEM — Z01.00 VISION TEST: Status: RESOLVED | Noted: 2017-06-08 | Resolved: 2019-08-16

## 2019-08-16 PROBLEM — H93.8X1 PRESSURE SENSATION IN RIGHT EAR: Status: RESOLVED | Noted: 2018-07-26 | Resolved: 2019-08-16

## 2019-08-16 PROBLEM — Z92.29 HISTORY OF PNEUMOCOCCAL VACCINATION: Status: RESOLVED | Noted: 2019-04-25 | Resolved: 2019-08-16

## 2019-08-16 PROBLEM — Z87.19 HISTORY OF CONSTIPATION: Status: RESOLVED | Noted: 2017-10-20 | Resolved: 2019-08-16

## 2019-08-16 PROBLEM — Z11.59 NEED FOR HEPATITIS C SCREENING TEST: Status: RESOLVED | Noted: 2018-11-21 | Resolved: 2019-08-16

## 2019-08-16 NOTE — HISTORY OF PRESENT ILLNESS
[Post-Menopause, No Sxs] : post-menopausal, currently without symptoms [Menarche Age: ____] : age at menarche was [unfilled] [Last Mammogram ___] : Last Mammogram was [unfilled] [Last Bone Density ___] : Last bone density studies [unfilled] [Last Colonoscopy ___] : Last colonoscopy [unfilled] [Last Pap ___] : Last cervical pap smear was [unfilled] [Postmenopausal] : is postmenopausal [Vaginal Itching] : vaginal itching [Sexually Active] : is sexually active

## 2019-09-23 ENCOUNTER — APPOINTMENT (OUTPATIENT)
Dept: MAMMOGRAPHY | Facility: HOSPITAL | Age: 69
End: 2019-09-23
Payer: MEDICARE

## 2019-09-23 ENCOUNTER — OUTPATIENT (OUTPATIENT)
Dept: OUTPATIENT SERVICES | Facility: HOSPITAL | Age: 69
LOS: 1 days | End: 2019-09-23
Payer: MEDICARE

## 2019-09-23 PROCEDURE — 77063 BREAST TOMOSYNTHESIS BI: CPT

## 2019-09-23 PROCEDURE — 77067 SCR MAMMO BI INCL CAD: CPT

## 2019-09-23 PROCEDURE — 77063 BREAST TOMOSYNTHESIS BI: CPT | Mod: 26

## 2019-09-23 PROCEDURE — 77067 SCR MAMMO BI INCL CAD: CPT | Mod: 26

## 2019-10-01 ENCOUNTER — APPOINTMENT (OUTPATIENT)
Dept: INTERNAL MEDICINE | Facility: CLINIC | Age: 69
End: 2019-10-01
Payer: MEDICARE

## 2019-10-01 VITALS
DIASTOLIC BLOOD PRESSURE: 61 MMHG | SYSTOLIC BLOOD PRESSURE: 141 MMHG | TEMPERATURE: 97.4 F | WEIGHT: 112 LBS | OXYGEN SATURATION: 99 % | HEIGHT: 62 IN | BODY MASS INDEX: 20.61 KG/M2 | HEART RATE: 62 BPM

## 2019-10-01 DIAGNOSIS — G44.209 TENSION-TYPE HEADACHE, UNSPECIFIED, NOT INTRACTABLE: ICD-10-CM

## 2019-10-01 PROCEDURE — 99214 OFFICE O/P EST MOD 30 MIN: CPT | Mod: GC

## 2019-10-18 ENCOUNTER — OUTPATIENT (OUTPATIENT)
Dept: OUTPATIENT SERVICES | Facility: HOSPITAL | Age: 69
LOS: 1 days | End: 2019-10-18
Payer: MEDICARE

## 2019-10-18 ENCOUNTER — APPOINTMENT (OUTPATIENT)
Dept: ULTRASOUND IMAGING | Facility: HOSPITAL | Age: 69
End: 2019-10-18
Payer: MEDICARE

## 2019-10-18 PROCEDURE — 76641 ULTRASOUND BREAST COMPLETE: CPT | Mod: 26,50

## 2019-10-18 PROCEDURE — 76641 ULTRASOUND BREAST COMPLETE: CPT

## 2019-11-21 ENCOUNTER — RX RENEWAL (OUTPATIENT)
Age: 69
End: 2019-11-21

## 2019-11-22 ENCOUNTER — APPOINTMENT (OUTPATIENT)
Dept: OBGYN | Facility: CLINIC | Age: 69
End: 2019-11-22

## 2019-12-05 ENCOUNTER — APPOINTMENT (OUTPATIENT)
Dept: INTERNAL MEDICINE | Facility: CLINIC | Age: 69
End: 2019-12-05
Payer: MEDICARE

## 2019-12-05 VITALS
OXYGEN SATURATION: 96 % | WEIGHT: 111 LBS | BODY MASS INDEX: 20.43 KG/M2 | HEIGHT: 62 IN | SYSTOLIC BLOOD PRESSURE: 125 MMHG | TEMPERATURE: 97 F | DIASTOLIC BLOOD PRESSURE: 72 MMHG | HEART RATE: 79 BPM

## 2019-12-05 PROCEDURE — 99214 OFFICE O/P EST MOD 30 MIN: CPT

## 2019-12-05 RX ORDER — FLUTICASONE PROPIONATE 50 UG/1
50 SPRAY, METERED NASAL
Qty: 16 | Refills: 1 | Status: DISCONTINUED | COMMUNITY
Start: 2019-05-21 | End: 2019-12-05

## 2019-12-05 RX ORDER — FLUTICASONE PROPIONATE 50 UG/1
50 SPRAY, METERED NASAL DAILY
Qty: 1 | Refills: 0 | Status: DISCONTINUED | COMMUNITY
Start: 2019-04-25 | End: 2019-12-05

## 2020-01-06 ENCOUNTER — APPOINTMENT (OUTPATIENT)
Dept: OBGYN | Facility: CLINIC | Age: 70
End: 2020-01-06
Payer: MEDICARE

## 2020-01-06 DIAGNOSIS — R92.8 OTHER ABNORMAL AND INCONCLUSIVE FINDINGS ON DIAGNOSTIC IMAGING OF BREAST: ICD-10-CM

## 2020-01-06 PROCEDURE — 99211 OFF/OP EST MAY X REQ PHY/QHP: CPT

## 2020-01-07 PROBLEM — R92.8 ABNORMAL MAMMOGRAM: Status: ACTIVE | Noted: 2018-07-11

## 2020-01-29 ENCOUNTER — APPOINTMENT (OUTPATIENT)
Dept: MRI IMAGING | Facility: HOSPITAL | Age: 70
End: 2020-01-29

## 2020-01-29 ENCOUNTER — OUTPATIENT (OUTPATIENT)
Dept: OUTPATIENT SERVICES | Facility: HOSPITAL | Age: 70
LOS: 1 days | End: 2020-01-29
Payer: MEDICARE

## 2020-01-29 PROCEDURE — C8906: CPT

## 2020-01-29 PROCEDURE — A9585: CPT

## 2020-02-10 ENCOUNTER — APPOINTMENT (OUTPATIENT)
Dept: OPHTHALMOLOGY | Facility: CLINIC | Age: 70
End: 2020-02-10

## 2020-02-28 ENCOUNTER — APPOINTMENT (OUTPATIENT)
Dept: INTERNAL MEDICINE | Facility: CLINIC | Age: 70
End: 2020-02-28
Payer: MEDICARE

## 2020-02-28 VITALS
DIASTOLIC BLOOD PRESSURE: 93 MMHG | SYSTOLIC BLOOD PRESSURE: 161 MMHG | TEMPERATURE: 97.7 F | HEART RATE: 62 BPM | OXYGEN SATURATION: 98 %

## 2020-02-28 VITALS — DIASTOLIC BLOOD PRESSURE: 80 MMHG | SYSTOLIC BLOOD PRESSURE: 130 MMHG

## 2020-02-28 DIAGNOSIS — R68.89 OTHER GENERAL SYMPTOMS AND SIGNS: ICD-10-CM

## 2020-02-28 DIAGNOSIS — Z87.09 PERSONAL HISTORY OF OTHER DISEASES OF THE RESPIRATORY SYSTEM: ICD-10-CM

## 2020-02-28 DIAGNOSIS — Z71.84 ENC FOR HEALTH COUNSELING RELATED TO TRAVEL: ICD-10-CM

## 2020-02-28 DIAGNOSIS — Z87.898 PERSONAL HISTORY OF OTHER SPECIFIED CONDITIONS: ICD-10-CM

## 2020-02-28 DIAGNOSIS — Z28.21 IMMUNIZATION NOT CARRIED OUT BECAUSE OF PATIENT REFUSAL: ICD-10-CM

## 2020-02-28 PROCEDURE — 99213 OFFICE O/P EST LOW 20 MIN: CPT | Mod: GC,25

## 2020-02-28 PROCEDURE — 36415 COLL VENOUS BLD VENIPUNCTURE: CPT

## 2020-03-02 LAB
ANION GAP SERPL CALC-SCNC: 14 MMOL/L
BUN SERPL-MCNC: 16 MG/DL
CALCIUM SERPL-MCNC: 9.8 MG/DL
CHLORIDE SERPL-SCNC: 105 MMOL/L
CHOLEST SERPL-MCNC: 200 MG/DL
CHOLEST/HDLC SERPL: 2.2 RATIO
CO2 SERPL-SCNC: 22 MMOL/L
CREAT SERPL-MCNC: 0.67 MG/DL
ESTIMATED AVERAGE GLUCOSE: 94 MG/DL
GLUCOSE SERPL-MCNC: 87 MG/DL
HBA1C MFR BLD HPLC: 4.9 %
HDLC SERPL-MCNC: 92 MG/DL
LDLC SERPL CALC-MCNC: 71 MG/DL
POTASSIUM SERPL-SCNC: 4.8 MMOL/L
SODIUM SERPL-SCNC: 141 MMOL/L
TRIGL SERPL-MCNC: 186 MG/DL

## 2020-03-17 ENCOUNTER — APPOINTMENT (OUTPATIENT)
Dept: INTERNAL MEDICINE | Facility: CLINIC | Age: 70
End: 2020-03-17

## 2020-03-19 ENCOUNTER — APPOINTMENT (OUTPATIENT)
Age: 70
End: 2020-03-19

## 2020-03-23 ENCOUNTER — RX RENEWAL (OUTPATIENT)
Age: 70
End: 2020-03-23

## 2020-04-03 ENCOUNTER — APPOINTMENT (OUTPATIENT)
Dept: OPHTHALMOLOGY | Facility: CLINIC | Age: 70
End: 2020-04-03

## 2020-04-27 ENCOUNTER — APPOINTMENT (OUTPATIENT)
Dept: INTERNAL MEDICINE | Facility: CLINIC | Age: 70
End: 2020-04-27
Payer: MEDICARE

## 2020-04-27 DIAGNOSIS — Z71.2 PERSON CONSULTING FOR EXPLANATION OF EXAMINATION OR TEST FINDINGS: ICD-10-CM

## 2020-04-27 PROCEDURE — 99213 OFFICE O/P EST LOW 20 MIN: CPT | Mod: 95

## 2020-04-27 NOTE — HISTORY OF PRESENT ILLNESS
[FreeTextEntry1] : Discussed with patient: You have chosen to receive care through the use of tele-media. Tele-media enables health care providers at different locations to provide safe, effective, and convenient care through the use of technology. Please note this is a billable encounter. As with any health care service, there are risks associated with the use of tele-media, including equipment failure, poor image and/or resolution, and  issues. You understand that I cannot physically examine you and that you may need to come to the clinic to complete the assessment. Patient agreed verbally to understanding the risks and benefits of tele-media as explained. All questions regarding tele-media encounters were answered. [de-identified] : CC: f/u of ongoing chronic medical problems\par \par HPI:  Has been well through the pandemic.  No illness, lives alone, able to quarantine herself safely and staying positive. \par Doing well keeping up healthy diet. \par Keeps moving within the house. \par Reviewed all meds.  Good meds adherence. \par Doesn't have a BP machine, so has not measured her BP, but upon discussion, she will either buy one herself if her insurance doesn't cover it at the pharmacy.

## 2020-04-27 NOTE — END OF VISIT
6/23/2017      Zoila Vasquez  [address]  [facility] [address]      RE: Appointment      Dear Zoila,     We have made numerous attempts to contact you via the telephone regarding rescheduling an appointment.    At your earliest convenience, please contact the clinic so that we may discuss your availability.    Sincerely,          Referral Specialist  [phone]    [address] [facility] [id number] [phone]     [>50% of Time Spent on Counseling and Coordination of Care for  ___] : Greater than 50% of the encounter time was spent on counseling and coordination of care for [unfilled] [Time Spent: ___ minutes] : I have spent [unfilled] minutes of face to face time with the patient

## 2020-04-27 NOTE — PLAN
[FreeTextEntry1] : Encouraged staying well, safe and positive. \par HTN: A few high readings in the past.  Advised obtaining a BP machine to check at home.  \par \par RTO after pandemic

## 2020-04-28 ENCOUNTER — APPOINTMENT (OUTPATIENT)
Dept: INTERNAL MEDICINE | Facility: CLINIC | Age: 70
End: 2020-04-28

## 2020-06-03 ENCOUNTER — APPOINTMENT (OUTPATIENT)
Dept: OBGYN | Facility: CLINIC | Age: 70
End: 2020-06-03

## 2020-06-12 ENCOUNTER — APPOINTMENT (OUTPATIENT)
Dept: INTERNAL MEDICINE | Facility: CLINIC | Age: 70
End: 2020-06-12
Payer: MEDICARE

## 2020-06-12 VITALS — DIASTOLIC BLOOD PRESSURE: 74 MMHG | HEART RATE: 74 BPM | SYSTOLIC BLOOD PRESSURE: 128 MMHG | RESPIRATION RATE: 14 BRPM

## 2020-06-12 DIAGNOSIS — Z11.59 ENCOUNTER FOR SCREENING FOR OTHER VIRAL DISEASES: ICD-10-CM

## 2020-06-12 PROCEDURE — 99212 OFFICE O/P EST SF 10 MIN: CPT | Mod: 25

## 2020-06-12 PROCEDURE — 36415 COLL VENOUS BLD VENIPUNCTURE: CPT

## 2020-06-16 LAB
SARS-COV-2 IGG SERPL IA-ACNC: <0.1 INDEX
SARS-COV-2 IGG SERPL QL IA: NEGATIVE

## 2020-06-24 ENCOUNTER — LABORATORY RESULT (OUTPATIENT)
Age: 70
End: 2020-06-24

## 2020-06-24 ENCOUNTER — APPOINTMENT (OUTPATIENT)
Dept: OBGYN | Facility: CLINIC | Age: 70
End: 2020-06-24
Payer: MEDICARE

## 2020-06-24 DIAGNOSIS — R35.0 FREQUENCY OF MICTURITION: ICD-10-CM

## 2020-06-24 DIAGNOSIS — N95.2 POSTMENOPAUSAL ATROPHIC VAGINITIS: ICD-10-CM

## 2020-06-24 DIAGNOSIS — R30.0 DYSURIA: ICD-10-CM

## 2020-06-24 DIAGNOSIS — N90.89 OTHER SPECIFIED NONINFLAMMATORY DISORDERS OF VULVA AND PERINEUM: ICD-10-CM

## 2020-06-24 DIAGNOSIS — N89.8 OTHER SPECIFIED NONINFLAMMATORY DISORDERS OF VAGINA: ICD-10-CM

## 2020-06-24 DIAGNOSIS — R14.0 ABDOMINAL DISTENSION (GASEOUS): ICD-10-CM

## 2020-06-24 PROCEDURE — 99214 OFFICE O/P EST MOD 30 MIN: CPT

## 2020-06-24 NOTE — PHYSICAL EXAM
[Awake] : awake [Alert] : alert [Acute Distress] : no acute distress [Mass] : no breast mass [Axillary LAD] : no axillary lymphadenopathy [Nipple Discharge] : no nipple discharge [Tender] : non tender [Soft] : soft [Oriented x3] : oriented to person, place, and time [Normal] : uterus [Atrophy] : atrophy [Pap Obtained] : a Pap smear was performed [No Bleeding] : there was no active vaginal bleeding [Uterine Adnexae] : were not tender and not enlarged

## 2020-06-25 ENCOUNTER — NON-APPOINTMENT (OUTPATIENT)
Age: 70
End: 2020-06-25

## 2020-06-25 ENCOUNTER — APPOINTMENT (OUTPATIENT)
Dept: OPHTHALMOLOGY | Facility: CLINIC | Age: 70
End: 2020-06-25
Payer: MEDICARE

## 2020-06-25 LAB
CANDIDA VAG CYTO: NOT DETECTED
G VAGINALIS+PREV SP MTYP VAG QL MICRO: NOT DETECTED
T VAGINALIS VAG QL WET PREP: NOT DETECTED

## 2020-06-25 PROCEDURE — 92012 INTRM OPH EXAM EST PATIENT: CPT

## 2020-06-25 PROCEDURE — 92083 EXTENDED VISUAL FIELD XM: CPT

## 2020-06-26 DIAGNOSIS — Z86.39 PERSONAL HISTORY OF OTHER ENDOCRINE, NUTRITIONAL AND METABOLIC DISEASE: ICD-10-CM

## 2020-06-29 LAB
BACTERIA UR CULT: NORMAL
HPV HIGH+LOW RISK DNA PNL CVX: DETECTED

## 2020-07-06 ENCOUNTER — APPOINTMENT (OUTPATIENT)
Dept: ANTEPARTUM | Facility: CLINIC | Age: 70
End: 2020-07-06

## 2020-07-06 ENCOUNTER — APPOINTMENT (OUTPATIENT)
Dept: OBGYN | Facility: CLINIC | Age: 70
End: 2020-07-06
Payer: MEDICARE

## 2020-07-06 VITALS — TEMPERATURE: 97.6 F

## 2020-07-06 PROCEDURE — 57454 BX/CURETT OF CERVIX W/SCOPE: CPT

## 2020-07-06 NOTE — PROCEDURE
[Colposcopy] : colposcopy [ASCUS] : atypical squamous cells of undetermined significance [HPV high risk] : PCR positive for high risk HPV [Patient] : patient [Consent Obtained] : written consent was obtained prior to the procedure [Risks] : risks [Benefits] : benefits [Alternatives] : alternatives [Infection] : infection [Pap Performed] : a cervical Pap smear was not performed [Allergic Reaction] : allergic reaction [Bleeding] : bleeding [SCJ Fully Visulized] : the squamocolumnar junction was not fully visualized [No Abnormalities] : no abnormalities [Biopsies Taken: # ___] : [unfilled] biopsies taken of the cervix [Biopsy Locations ___ o'clock] : the biopsies were taken at [unfilled] o'clock [ECC Done] : Endocervical curettage was performed.  [Jacinda's] : Jacinda's solution [Silver Nitrate] : silver nitrate [Tolerated Well] : the patient tolerated the procedure well [No Complications] : there were no complications

## 2020-07-06 NOTE — END OF VISIT
[FreeTextEntry3] : Colposcopy done without complications.\par Discussed findings with patient.\par Follow up for results.

## 2020-07-22 ENCOUNTER — APPOINTMENT (OUTPATIENT)
Dept: ANTEPARTUM | Facility: CLINIC | Age: 70
End: 2020-07-22

## 2020-09-23 ENCOUNTER — APPOINTMENT (OUTPATIENT)
Dept: MAMMOGRAPHY | Facility: CLINIC | Age: 70
End: 2020-09-23
Payer: MEDICARE

## 2020-09-23 ENCOUNTER — RESULT REVIEW (OUTPATIENT)
Age: 70
End: 2020-09-23

## 2020-09-23 ENCOUNTER — APPOINTMENT (OUTPATIENT)
Dept: ULTRASOUND IMAGING | Facility: CLINIC | Age: 70
End: 2020-09-23

## 2020-09-23 ENCOUNTER — OUTPATIENT (OUTPATIENT)
Dept: OUTPATIENT SERVICES | Facility: HOSPITAL | Age: 70
LOS: 1 days | End: 2020-09-23

## 2020-09-23 PROCEDURE — 76641 ULTRASOUND BREAST COMPLETE: CPT | Mod: 26,50

## 2020-09-23 PROCEDURE — 77063 BREAST TOMOSYNTHESIS BI: CPT | Mod: 26

## 2020-09-23 PROCEDURE — 77067 SCR MAMMO BI INCL CAD: CPT | Mod: 26

## 2020-09-29 ENCOUNTER — RX RENEWAL (OUTPATIENT)
Age: 70
End: 2020-09-29

## 2020-10-09 ENCOUNTER — APPOINTMENT (OUTPATIENT)
Dept: INTERNAL MEDICINE | Facility: CLINIC | Age: 70
End: 2020-10-09
Payer: MEDICARE

## 2020-10-09 VITALS
OXYGEN SATURATION: 99 % | TEMPERATURE: 98.1 F | HEART RATE: 65 BPM | SYSTOLIC BLOOD PRESSURE: 168 MMHG | BODY MASS INDEX: 19.57 KG/M2 | DIASTOLIC BLOOD PRESSURE: 82 MMHG | WEIGHT: 107 LBS

## 2020-10-09 VITALS — SYSTOLIC BLOOD PRESSURE: 146 MMHG | DIASTOLIC BLOOD PRESSURE: 90 MMHG

## 2020-10-09 PROCEDURE — 93000 ELECTROCARDIOGRAM COMPLETE: CPT

## 2020-10-09 PROCEDURE — 36415 COLL VENOUS BLD VENIPUNCTURE: CPT

## 2020-10-09 PROCEDURE — 99214 OFFICE O/P EST MOD 30 MIN: CPT | Mod: 25

## 2020-10-13 LAB
ANION GAP SERPL CALC-SCNC: 13 MMOL/L
BUN SERPL-MCNC: 13 MG/DL
CALCIUM SERPL-MCNC: 10 MG/DL
CHLORIDE SERPL-SCNC: 104 MMOL/L
CO2 SERPL-SCNC: 24 MMOL/L
CREAT SERPL-MCNC: 0.61 MG/DL
CREAT SPEC-SCNC: 25 MG/DL
GLUCOSE SERPL-MCNC: 81 MG/DL
MICROALBUMIN 24H UR DL<=1MG/L-MCNC: <1.2 MG/DL
MICROALBUMIN/CREAT 24H UR-RTO: NORMAL MG/G
POTASSIUM SERPL-SCNC: 4.9 MMOL/L
SODIUM SERPL-SCNC: 140 MMOL/L

## 2020-11-30 ENCOUNTER — APPOINTMENT (OUTPATIENT)
Dept: INTERNAL MEDICINE | Facility: CLINIC | Age: 70
End: 2020-11-30

## 2020-12-07 ENCOUNTER — APPOINTMENT (OUTPATIENT)
Dept: INTERNAL MEDICINE | Facility: CLINIC | Age: 70
End: 2020-12-07

## 2020-12-17 ENCOUNTER — RX RENEWAL (OUTPATIENT)
Age: 70
End: 2020-12-17

## 2021-01-15 ENCOUNTER — APPOINTMENT (OUTPATIENT)
Dept: INTERNAL MEDICINE | Facility: CLINIC | Age: 71
End: 2021-01-15
Payer: MEDICARE

## 2021-01-15 VITALS
SYSTOLIC BLOOD PRESSURE: 171 MMHG | TEMPERATURE: 97.2 F | HEART RATE: 67 BPM | WEIGHT: 106 LBS | DIASTOLIC BLOOD PRESSURE: 79 MMHG | BODY MASS INDEX: 19.39 KG/M2 | OXYGEN SATURATION: 99 %

## 2021-01-15 PROCEDURE — 99214 OFFICE O/P EST MOD 30 MIN: CPT

## 2021-01-15 PROCEDURE — 99072 ADDL SUPL MATRL&STAF TM PHE: CPT

## 2021-02-08 ENCOUNTER — APPOINTMENT (OUTPATIENT)
Dept: OBGYN | Facility: CLINIC | Age: 71
End: 2021-02-08
Payer: MEDICARE

## 2021-02-08 VITALS
HEIGHT: 62 IN | SYSTOLIC BLOOD PRESSURE: 162 MMHG | BODY MASS INDEX: 20.8 KG/M2 | DIASTOLIC BLOOD PRESSURE: 82 MMHG | WEIGHT: 113 LBS

## 2021-02-08 PROCEDURE — 99072 ADDL SUPL MATRL&STAF TM PHE: CPT

## 2021-02-08 PROCEDURE — 99212 OFFICE O/P EST SF 10 MIN: CPT

## 2021-02-08 NOTE — HISTORY OF PRESENT ILLNESS
[FreeTextEntry1] : 71 yo pt presents for evaluation of pelvic pain since January 15. Reports she also has pain with intercourse. She thought she may have an infection and cleaned with a vinegar mix. Denies changes to bowel patterns, vaginal bleeding, f/c, n/v, bloating.

## 2021-02-08 NOTE — PLAN
[FreeTextEntry1] : TVUS ordered will fu prn results. Advised pt she must fu with her PCP for her elevated BP reading. All questions answered.

## 2021-02-12 ENCOUNTER — APPOINTMENT (OUTPATIENT)
Dept: INTERNAL MEDICINE | Facility: CLINIC | Age: 71
End: 2021-02-12

## 2021-02-12 ENCOUNTER — APPOINTMENT (OUTPATIENT)
Dept: INTERNAL MEDICINE | Facility: CLINIC | Age: 71
End: 2021-02-12
Payer: MEDICARE

## 2021-02-12 PROCEDURE — 99442: CPT

## 2021-02-12 NOTE — END OF VISIT
[FreeTextEntry3] : 71 yo female reports she recently saw gyn for pelvic pain and had US. States she was told she "may need a CT scan" and requesting that we order CT.\par \par 1) pelvic discomfort - etiology unclear, reviewed gyn note - exam WNL.  US done, awaiting official results.  counseled pt that further testing will be pursued based upon results of office US.  Counseled to f/u with gyn and agreeable.  she verbalized understanding. [Time Spent: ___ minutes] : I have spent [unfilled] minutes of time on the encounter.

## 2021-02-12 NOTE — PHYSICAL EXAM
[Speech Grossly Normal] : speech grossly normal [Memory Grossly Normal] : memory grossly normal [Normal Affect] : the affect was normal [Alert and Oriented x3] : oriented to person, place, and time [Normal Mood] : the mood was normal [Normal Insight/Judgement] : insight and judgment were intact [de-identified] : Normal voice, normal communication (telephonic visit) -- accented speech. [de-identified] : Bubbly and effusive; very pleasant. Responds appropriately to reassurance and education.

## 2021-02-12 NOTE — HISTORY OF PRESENT ILLNESS
[Home] : at home, [unfilled] , at the time of the visit. [Medical Office: ([facility])___] : at the medical office located in  [Verbal consent obtained from patient] : the patient, [unfilled] [FreeTextEntry1] : "I need a CT scan" [FreeTextEntry4] : Dr. Kwan [de-identified] : Mrs. Fernandez is a 69 yo F with 1 month history of pelvic pain. She attended gyn as referred and had pelvic US performed, with incomplete imaging due to a full bladder. She states the technician or radiologist (unclear) said she needed "a CT or an MRI" for complete imaging. She reports SOB, paroxysmal, which she relates to fears about her health and her mother's health. SOB is not worsened with exertion; it never wakes her from sleep. Denies chest pain, abd pain, fever, chills, URI symptoms.\par \par Provided reassurance and encouraged patient to await the formal read of her US prior to pursuing further imaging. Patient plans on calling her gyn for formal read on Tues and will get order from their office if further imaging is clinically indicated. \par \par Patient agreed she needs someone to talk to about stressors, especially her worries about her mother, who is in poor health [place]; quarantine restrictions prevent the patient from visiting, and this has been weighing on her mind heavily. She is amenable to speak to [facility].

## 2021-02-22 ENCOUNTER — APPOINTMENT (OUTPATIENT)
Dept: INTERNAL MEDICINE | Facility: CLINIC | Age: 71
End: 2021-02-22

## 2021-03-05 ENCOUNTER — APPOINTMENT (OUTPATIENT)
Dept: INTERNAL MEDICINE | Facility: CLINIC | Age: 71
End: 2021-03-05

## 2021-03-05 ENCOUNTER — APPOINTMENT (OUTPATIENT)
Dept: INTERNAL MEDICINE | Facility: CLINIC | Age: 71
End: 2021-03-05
Payer: MEDICARE

## 2021-03-05 VITALS
BODY MASS INDEX: 20.49 KG/M2 | HEART RATE: 74 BPM | TEMPERATURE: 98.1 F | OXYGEN SATURATION: 100 % | WEIGHT: 112 LBS | SYSTOLIC BLOOD PRESSURE: 195 MMHG | DIASTOLIC BLOOD PRESSURE: 81 MMHG

## 2021-03-05 DIAGNOSIS — R94.31 ABNORMAL ELECTROCARDIOGRAM [ECG] [EKG]: ICD-10-CM

## 2021-03-05 PROCEDURE — 36415 COLL VENOUS BLD VENIPUNCTURE: CPT

## 2021-03-05 PROCEDURE — 99072 ADDL SUPL MATRL&STAF TM PHE: CPT

## 2021-03-05 PROCEDURE — 99215 OFFICE O/P EST HI 40 MIN: CPT | Mod: GC,25

## 2021-03-05 PROCEDURE — 93000 ELECTROCARDIOGRAM COMPLETE: CPT

## 2021-03-08 PROBLEM — R94.31 ABNORMAL EKG: Status: ACTIVE | Noted: 2021-03-08

## 2021-03-09 ENCOUNTER — NON-APPOINTMENT (OUTPATIENT)
Age: 71
End: 2021-03-09

## 2021-03-09 ENCOUNTER — OUTPATIENT (OUTPATIENT)
Dept: OUTPATIENT SERVICES | Facility: HOSPITAL | Age: 71
LOS: 1 days | End: 2021-03-09
Payer: MEDICARE

## 2021-03-09 ENCOUNTER — APPOINTMENT (OUTPATIENT)
Dept: HEART AND VASCULAR | Facility: CLINIC | Age: 71
End: 2021-03-09
Payer: MEDICARE

## 2021-03-09 VITALS
HEIGHT: 60.24 IN | TEMPERATURE: 98.6 F | HEART RATE: 68 BPM | OXYGEN SATURATION: 100 % | BODY MASS INDEX: 21.51 KG/M2 | WEIGHT: 111 LBS | DIASTOLIC BLOOD PRESSURE: 92 MMHG | SYSTOLIC BLOOD PRESSURE: 188 MMHG

## 2021-03-09 LAB
ALBUMIN SERPL ELPH-MCNC: 4.7 G/DL
ALP BLD-CCNC: 95 U/L
ALT SERPL-CCNC: 20 U/L
ANION GAP SERPL CALC-SCNC: 14 MMOL/L
AST SERPL-CCNC: 33 U/L
BASOPHILS # BLD AUTO: 0.02 K/UL
BASOPHILS NFR BLD AUTO: 0.3 %
BILIRUB SERPL-MCNC: 0.4 MG/DL
BUN SERPL-MCNC: 16 MG/DL
CALCIUM SERPL-MCNC: 9.9 MG/DL
CHLORIDE SERPL-SCNC: 104 MMOL/L
CO2 SERPL-SCNC: 23 MMOL/L
CREAT SERPL-MCNC: 0.67 MG/DL
EOSINOPHIL # BLD AUTO: 0.08 K/UL
EOSINOPHIL NFR BLD AUTO: 1.3 %
GLUCOSE SERPL-MCNC: 97 MG/DL
HCT VFR BLD CALC: 42.5 %
HGB BLD-MCNC: 13.6 G/DL
IMM GRANULOCYTES NFR BLD AUTO: 0.2 %
LYMPHOCYTES # BLD AUTO: 1.64 K/UL
LYMPHOCYTES NFR BLD AUTO: 25.8 %
MAN DIFF?: NORMAL
MCHC RBC-ENTMCNC: 32 GM/DL
MCHC RBC-ENTMCNC: 32.5 PG
MCV RBC AUTO: 101.7 FL
MONOCYTES # BLD AUTO: 0.48 K/UL
MONOCYTES NFR BLD AUTO: 7.6 %
NEUTROPHILS # BLD AUTO: 4.12 K/UL
NEUTROPHILS NFR BLD AUTO: 64.8 %
PLATELET # BLD AUTO: 297 K/UL
POTASSIUM SERPL-SCNC: 4.5 MMOL/L
PROT SERPL-MCNC: 6.9 G/DL
RBC # BLD: 4.18 M/UL
RBC # FLD: 12.6 %
SODIUM SERPL-SCNC: 140 MMOL/L
TSH SERPL-ACNC: 1.77 UIU/ML
WBC # FLD AUTO: 6.35 K/UL

## 2021-03-09 PROCEDURE — 93306 TTE W/DOPPLER COMPLETE: CPT

## 2021-03-09 PROCEDURE — 99204 OFFICE O/P NEW MOD 45 MIN: CPT

## 2021-03-09 PROCEDURE — 71046 X-RAY EXAM CHEST 2 VIEWS: CPT | Mod: 26

## 2021-03-09 PROCEDURE — 71046 X-RAY EXAM CHEST 2 VIEWS: CPT

## 2021-03-09 PROCEDURE — 93000 ELECTROCARDIOGRAM COMPLETE: CPT

## 2021-03-09 PROCEDURE — 99072 ADDL SUPL MATRL&STAF TM PHE: CPT

## 2021-03-09 NOTE — HISTORY OF PRESENT ILLNESS
[FreeTextEntry1] : 70 F referred by POLO Sprague for shortness of breath she has been shortness of breath was noted to have high BP  she feels like she cannot take a good deep breath she is under a lot of stress at home she is still active works a lot and exercise and since march its worse when she is aggravated no exertional symptoms she feels better when she is walking she has very good exercise tolerance \par \par \par ecg nsr

## 2021-03-09 NOTE — PHYSICAL EXAM
[General Appearance - Well Developed] : well developed [Normal Appearance] : normal appearance [Well Groomed] : well groomed [General Appearance - Well Nourished] : well nourished [No Deformities] : no deformities [General Appearance - In No Acute Distress] : no acute distress [Normal Conjunctiva] : the conjunctiva exhibited no abnormalities [Eyelids - No Xanthelasma] : the eyelids demonstrated no xanthelasmas [Normal Oral Mucosa] : normal oral mucosa [No Oral Pallor] : no oral pallor [No Oral Cyanosis] : no oral cyanosis [Normal Jugular Venous A Waves Present] : normal jugular venous A waves present [Normal Jugular Venous V Waves Present] : normal jugular venous V waves present [No Jugular Venous Shanks A Waves] : no jugular venous shanks A waves [Heart Rate And Rhythm] : heart rate and rhythm were normal [Heart Sounds] : normal S1 and S2 [Murmurs] : no murmurs present [Respiration, Rhythm And Depth] : normal respiratory rhythm and effort [Exaggerated Use Of Accessory Muscles For Inspiration] : no accessory muscle use [Auscultation Breath Sounds / Voice Sounds] : lungs were clear to auscultation bilaterally [Abdomen Soft] : soft [Abdomen Tenderness] : non-tender [Abdomen Mass (___ Cm)] : no abdominal mass palpated [Abnormal Walk] : normal gait [Gait - Sufficient For Exercise Testing] : the gait was sufficient for exercise testing [Nail Clubbing] : no clubbing of the fingernails [Cyanosis, Localized] : no localized cyanosis [Petechial Hemorrhages (___cm)] : no petechial hemorrhages [Skin Color & Pigmentation] : normal skin color and pigmentation [] : no rash [No Venous Stasis] : no venous stasis [Skin Lesions] : no skin lesions [No Skin Ulcers] : no skin ulcer [No Xanthoma] : no  xanthoma was observed [Oriented To Time, Place, And Person] : oriented to person, place, and time [Affect] : the affect was normal [Mood] : the mood was normal [No Anxiety] : not feeling anxious

## 2021-03-09 NOTE — ASSESSMENT
[FreeTextEntry1] : I don’t suspect her sob is due to CAD i think she may need better BP control although i suspect her BP is exacerbated by stress and anxiety\par she will check her bp at home\par i will do an echocardiogram\par she will start amlodipine 5 mg once a day

## 2021-03-11 ENCOUNTER — NON-APPOINTMENT (OUTPATIENT)
Age: 71
End: 2021-03-11

## 2021-03-11 ENCOUNTER — APPOINTMENT (OUTPATIENT)
Dept: OPHTHALMOLOGY | Facility: CLINIC | Age: 71
End: 2021-03-11
Payer: MEDICARE

## 2021-03-11 ENCOUNTER — APPOINTMENT (OUTPATIENT)
Dept: PULMONOLOGY | Facility: CLINIC | Age: 71
End: 2021-03-11
Payer: MEDICARE

## 2021-03-11 VITALS
TEMPERATURE: 97.8 F | OXYGEN SATURATION: 99 % | WEIGHT: 111 LBS | HEIGHT: 61 IN | DIASTOLIC BLOOD PRESSURE: 82 MMHG | SYSTOLIC BLOOD PRESSURE: 150 MMHG | HEART RATE: 73 BPM | BODY MASS INDEX: 20.96 KG/M2

## 2021-03-11 PROCEDURE — 92133 CPTRZD OPH DX IMG PST SGM ON: CPT

## 2021-03-11 PROCEDURE — 92012 INTRM OPH EXAM EST PATIENT: CPT

## 2021-03-11 PROCEDURE — 92083 EXTENDED VISUAL FIELD XM: CPT

## 2021-03-11 PROCEDURE — 99203 OFFICE O/P NEW LOW 30 MIN: CPT

## 2021-03-12 ENCOUNTER — APPOINTMENT (OUTPATIENT)
Dept: INTERNAL MEDICINE | Facility: CLINIC | Age: 71
End: 2021-03-12
Payer: MEDICARE

## 2021-03-12 VITALS
OXYGEN SATURATION: 100 % | SYSTOLIC BLOOD PRESSURE: 140 MMHG | TEMPERATURE: 97.9 F | WEIGHT: 111 LBS | HEIGHT: 61 IN | HEART RATE: 65 BPM | DIASTOLIC BLOOD PRESSURE: 72 MMHG | RESPIRATION RATE: 15 BRPM | BODY MASS INDEX: 20.96 KG/M2

## 2021-03-12 PROCEDURE — 99214 OFFICE O/P EST MOD 30 MIN: CPT

## 2021-03-16 LAB — SARS-COV-2 N GENE NPH QL NAA+PROBE: NOT DETECTED

## 2021-03-17 ENCOUNTER — APPOINTMENT (OUTPATIENT)
Dept: HEART AND VASCULAR | Facility: CLINIC | Age: 71
End: 2021-03-17
Payer: MEDICARE

## 2021-03-17 VITALS
HEART RATE: 70 BPM | WEIGHT: 109 LBS | SYSTOLIC BLOOD PRESSURE: 157 MMHG | OXYGEN SATURATION: 99 % | TEMPERATURE: 98.5 F | DIASTOLIC BLOOD PRESSURE: 73 MMHG | BODY MASS INDEX: 20.58 KG/M2 | HEIGHT: 61 IN

## 2021-03-17 VITALS — DIASTOLIC BLOOD PRESSURE: 81 MMHG | SYSTOLIC BLOOD PRESSURE: 132 MMHG

## 2021-03-17 PROCEDURE — 99213 OFFICE O/P EST LOW 20 MIN: CPT

## 2021-03-17 NOTE — PROCEDURE
[Normal] : 3. No pericardial effusion. [de-identified] : Dr. Jack Hull (card) [de-identified] : Nicole Petri-Schoener, BRANDENCS [de-identified] : hypertension [de-identified] : 0.9 [de-identified] : 1.0 [de-identified] : 4.0 [de-identified] : 2.5 [de-identified] : 3.2 [de-identified] : 3.3 [de-identified] : 24 mmHg [de-identified] : 60-82 [de-identified] : Age appropriate diastolic function  [de-identified] : The left atrium is normal in size. The left atrial volume index is ml/m2. [de-identified] : There is trace pulmonic insufficiency. [de-identified] : There is minimal tricuspid regurgitation. [de-identified] : The inferior vena cava measures 1.1 cm. [FreeTextEntry1] : :1950\par Age: 70 y\par Sex: F\par BP: 178/86\par Height: 153 cm\par Weight: 50 kg\par BSA: 1.5 m2\par

## 2021-03-17 NOTE — ASSESSMENT
[FreeTextEntry1] : HTN bp home readings reviewed elevated here today its controlled would continue current regimen\par she is on a statin for high cholesterol\par fu in one month with home bp readings \par her shortness of braeth may be due to her HTN should be better now

## 2021-03-17 NOTE — PHYSICAL EXAM
[General Appearance - Well Developed] : well developed [Normal Appearance] : normal appearance [Well Groomed] : well groomed [General Appearance - Well Nourished] : well nourished [No Deformities] : no deformities [General Appearance - In No Acute Distress] : no acute distress [Normal Conjunctiva] : the conjunctiva exhibited no abnormalities [Eyelids - No Xanthelasma] : the eyelids demonstrated no xanthelasmas [Normal Oral Mucosa] : normal oral mucosa [No Oral Pallor] : no oral pallor [No Oral Cyanosis] : no oral cyanosis [Normal Jugular Venous A Waves Present] : normal jugular venous A waves present [Normal Jugular Venous V Waves Present] : normal jugular venous V waves present [No Jugular Venous Shanks A Waves] : no jugular venous shanks A waves [Respiration, Rhythm And Depth] : normal respiratory rhythm and effort [Exaggerated Use Of Accessory Muscles For Inspiration] : no accessory muscle use [Auscultation Breath Sounds / Voice Sounds] : lungs were clear to auscultation bilaterally [Heart Rate And Rhythm] : heart rate and rhythm were normal [Heart Sounds] : normal S1 and S2 [Murmurs] : no murmurs present [Abdomen Soft] : soft [Abdomen Tenderness] : non-tender [Abdomen Mass (___ Cm)] : no abdominal mass palpated [Abnormal Walk] : normal gait [Gait - Sufficient For Exercise Testing] : the gait was sufficient for exercise testing [Nail Clubbing] : no clubbing of the fingernails [Cyanosis, Localized] : no localized cyanosis [Petechial Hemorrhages (___cm)] : no petechial hemorrhages [Skin Color & Pigmentation] : normal skin color and pigmentation [] : no rash [No Venous Stasis] : no venous stasis [Skin Lesions] : no skin lesions [No Skin Ulcers] : no skin ulcer [No Xanthoma] : no  xanthoma was observed [Oriented To Time, Place, And Person] : oriented to person, place, and time [Affect] : the affect was normal [Mood] : the mood was normal [No Anxiety] : not feeling anxious

## 2021-03-17 NOTE — HISTORY OF PRESENT ILLNESS
[FreeTextEntry1] : 70 F HTN HPL Former smoker, here for follow up of her HTN management\par \par 3/17/2021 she was started on amlodipine tolerating well \par \par echo 3/2021 normal EF

## 2021-03-18 ENCOUNTER — APPOINTMENT (OUTPATIENT)
Dept: PULMONOLOGY | Facility: CLINIC | Age: 71
End: 2021-03-18
Payer: MEDICARE

## 2021-03-18 PROCEDURE — 94060 EVALUATION OF WHEEZING: CPT

## 2021-03-18 PROCEDURE — 94727 GAS DIL/WSHOT DETER LNG VOL: CPT

## 2021-03-18 PROCEDURE — 94729 DIFFUSING CAPACITY: CPT

## 2021-03-19 DIAGNOSIS — R94.2 ABNORMAL RESULTS OF PULMONARY FUNCTION STUDIES: ICD-10-CM

## 2021-03-19 NOTE — PHYSICAL EXAM
[No Acute Distress] : no acute distress [Well Nourished] : well nourished [Normal Oropharynx] : normal oropharynx [Normal Appearance] : normal appearance [Supple] : supple [Normal Rate/Rhythm] : normal rate/rhythm [Normal S1, S2] : normal s1, s2 [No Resp Distress] : no resp distress [No Acc Muscle Use] : no acc muscle use [Clear to Auscultation Bilaterally] : clear to auscultation bilaterally [Not Tender] : not tender [Normal Gait] : normal gait [No Clubbing] : no clubbing [No Edema] : no edema [Normal Color/ Pigmentation] : normal color/ pigmentation [Oriented x3] : oriented x3 [TextBox_68] : pressured speech  [TextBox_140] : anxious

## 2021-03-19 NOTE — HISTORY OF PRESENT ILLNESS
[TextBox_4] : 71 yo F, former smoker, with history of hypertension, hyperlipidemia presents for eval of shortness of breath. Reports that shortness of breath started about 1 month ago. Says she can take a deep breathe in and feels dyspneic when climbing up 4 flights of stairs to her apartment. Prior to this past month reports that she was very active, no difficulty breathing but recently has been under a lot of stress, fees=ls lonely and was also worried about her mother who is hospitalized in [place]. \par Admittedly, feels like her stress and anxiety is playing a big role in her shortness of breath and notes that "she would like to go out to the park and go for her walks again".

## 2021-03-19 NOTE — REVIEW OF SYSTEMS
[SOB on Exertion] : sob on exertion [Anxiety] : anxiety [Negative] : Musculoskeletal [Nasal Congestion] : no nasal congestion [Postnasal Drip] : no postnasal drip [Cough] : no cough [Chest Tightness] : no chest tightness [Sputum] : no sputum [Wheezing] : no wheezing [Chest Discomfort] : no chest discomfort [Orthopnea] : no orthopnea [Palpitations] : no palpitations [PND] : no PND

## 2021-03-19 NOTE — ASSESSMENT
[FreeTextEntry1] : Data reviewed:\par CXR 3-5-21 - clear\par Spirometry 4-2019 -> normal \par \par Impression: \par Shortness of breath - had similar symptoms in 2-2019 when she saw Dr. Cooper and it was thought to be functional. \par I suspect that it is likely the case this time too and in part exacerbated by her anxiety.\par \par will repeat PFTs\par she will f/u with cardiology re: ECHO\par Patient encouraged to resume exercise and see psych re: anxiety\par \par Addendum: \par 3-19-21\par ECHO done with Dr. Hull was normal with normal PASP\par PFTs done on 3-18 showed normal lung volumes and spirometry. Diffusion capacity was moderately reduced. \par \par Will obtain non-contrast CT chest to eval of ILD, emphysema. \par \par \par

## 2021-03-26 ENCOUNTER — APPOINTMENT (OUTPATIENT)
Dept: CT IMAGING | Facility: HOSPITAL | Age: 71
End: 2021-03-26
Payer: MEDICARE

## 2021-03-26 ENCOUNTER — RESULT REVIEW (OUTPATIENT)
Age: 71
End: 2021-03-26

## 2021-03-26 ENCOUNTER — OUTPATIENT (OUTPATIENT)
Dept: OUTPATIENT SERVICES | Facility: HOSPITAL | Age: 71
LOS: 1 days | End: 2021-03-26
Payer: MEDICARE

## 2021-03-26 PROCEDURE — 71250 CT THORAX DX C-: CPT

## 2021-03-26 PROCEDURE — 71250 CT THORAX DX C-: CPT | Mod: 26,MH

## 2021-03-29 ENCOUNTER — APPOINTMENT (OUTPATIENT)
Dept: OBGYN | Facility: CLINIC | Age: 71
End: 2021-03-29

## 2021-04-16 ENCOUNTER — RX RENEWAL (OUTPATIENT)
Age: 71
End: 2021-04-16

## 2021-04-28 ENCOUNTER — APPOINTMENT (OUTPATIENT)
Dept: HEART AND VASCULAR | Facility: CLINIC | Age: 71
End: 2021-04-28
Payer: MEDICARE

## 2021-04-28 VITALS
TEMPERATURE: 97.8 F | HEIGHT: 61 IN | DIASTOLIC BLOOD PRESSURE: 56 MMHG | SYSTOLIC BLOOD PRESSURE: 124 MMHG | BODY MASS INDEX: 20.58 KG/M2 | WEIGHT: 109 LBS | OXYGEN SATURATION: 98 % | HEART RATE: 71 BPM

## 2021-04-28 PROCEDURE — 99214 OFFICE O/P EST MOD 30 MIN: CPT

## 2021-04-28 NOTE — PHYSICAL EXAM
[Well Developed] : well developed [Well Nourished] : well nourished [No Acute Distress] : no acute distress [Normal Venous Pressure] : normal venous pressure [No Carotid Bruit] : no carotid bruit [Normal S1, S2] : normal S1, S2 [No Murmur] : no murmur [No Rub] : no rub [No Gallop] : no gallop [Clear Lung Fields] : clear lung fields [Good Air Entry] : good air entry [No Respiratory Distress] : no respiratory distress  [Soft] : abdomen soft [Non Tender] : non-tender [No Masses/organomegaly] : no masses/organomegaly [Normal Bowel Sounds] : normal bowel sounds [Normal Gait] : normal gait [No Edema] : no edema [No Cyanosis] : no cyanosis [No Clubbing] : no clubbing [No Varicosities] : no varicosities [No Rash] : no rash [No Skin Lesions] : no skin lesions [Moves all extremities] : moves all extremities [No Focal Deficits] : no focal deficits [Normal Speech] : normal speech [Alert and Oriented] : alert and oriented [Normal memory] : normal memory [General Appearance - Well Developed] : well developed [Normal Appearance] : normal appearance [Well Groomed] : well groomed [General Appearance - Well Nourished] : well nourished [No Deformities] : no deformities [General Appearance - In No Acute Distress] : no acute distress [Normal Conjunctiva] : the conjunctiva exhibited no abnormalities [Eyelids - No Xanthelasma] : the eyelids demonstrated no xanthelasmas [Normal Oral Mucosa] : normal oral mucosa [No Oral Pallor] : no oral pallor [No Oral Cyanosis] : no oral cyanosis [Normal Jugular Venous A Waves Present] : normal jugular venous A waves present [Normal Jugular Venous V Waves Present] : normal jugular venous V waves present [No Jugular Venous Shanks A Waves] : no jugular venous shanks A waves [Respiration, Rhythm And Depth] : normal respiratory rhythm and effort [Exaggerated Use Of Accessory Muscles For Inspiration] : no accessory muscle use [Auscultation Breath Sounds / Voice Sounds] : lungs were clear to auscultation bilaterally [Heart Rate And Rhythm] : heart rate and rhythm were normal [Heart Sounds] : normal S1 and S2 [Murmurs] : no murmurs present [Abdomen Soft] : soft [Abdomen Tenderness] : non-tender [Abdomen Mass (___ Cm)] : no abdominal mass palpated [Abnormal Walk] : normal gait [Gait - Sufficient For Exercise Testing] : the gait was sufficient for exercise testing [Nail Clubbing] : no clubbing of the fingernails [Cyanosis, Localized] : no localized cyanosis [Petechial Hemorrhages (___cm)] : no petechial hemorrhages [Skin Color & Pigmentation] : normal skin color and pigmentation [] : no rash [No Venous Stasis] : no venous stasis [Skin Lesions] : no skin lesions [No Skin Ulcers] : no skin ulcer [No Xanthoma] : no  xanthoma was observed [Oriented To Time, Place, And Person] : oriented to person, place, and time [Affect] : the affect was normal [Mood] : the mood was normal [No Anxiety] : not feeling anxious

## 2021-04-28 NOTE — PROCEDURE
[Normal] : 3. No pericardial effusion. [de-identified] : Dr. Jack Hull (card) [de-identified] : Nicole Petri-Schoener, BRANDENCS [de-identified] : hypertension [de-identified] : 0.9 [de-identified] : 1.0 [de-identified] : 4.0 [de-identified] : 3.2 [de-identified] : 2.5 [de-identified] : 3.3 [de-identified] : 24 mmHg [de-identified] : 60-46 [de-identified] : Age appropriate diastolic function  [de-identified] : The left atrium is normal in size. The left atrial volume index is ml/m2. [de-identified] : There is trace pulmonic insufficiency. [de-identified] : There is minimal tricuspid regurgitation. [de-identified] : The inferior vena cava measures 1.1 cm. [FreeTextEntry1] : :1950\par Age: 70 y\par Sex: F\par BP: 178/86\par Height: 153 cm\par Weight: 50 kg\par BSA: 1.5 m2\par

## 2021-04-28 NOTE — ASSESSMENT
[FreeTextEntry1] : HTN bp is controlled \par she is still sob with do an EST \par she is on a statin for high cholesterol\par fu usual six months pending stress test results

## 2021-04-28 NOTE — HISTORY OF PRESENT ILLNESS
[FreeTextEntry1] : 70 F HTN HPL Former smoker, here for follow up of her HTN management and sob \par \par 3/17/2021 she was started on amlodipine tolerating well \par \par 4/28/2021 she is still feeling sob \par \par echo 3/2021 normal EF

## 2021-05-12 ENCOUNTER — APPOINTMENT (OUTPATIENT)
Dept: HEART AND VASCULAR | Facility: CLINIC | Age: 71
End: 2021-05-12

## 2021-05-12 ENCOUNTER — NON-APPOINTMENT (OUTPATIENT)
Age: 71
End: 2021-05-12

## 2021-05-12 ENCOUNTER — APPOINTMENT (OUTPATIENT)
Dept: OBGYN | Facility: CLINIC | Age: 71
End: 2021-05-12
Payer: MEDICARE

## 2021-05-12 VITALS
DIASTOLIC BLOOD PRESSURE: 80 MMHG | BODY MASS INDEX: 21.19 KG/M2 | SYSTOLIC BLOOD PRESSURE: 130 MMHG | HEART RATE: 76 BPM | OXYGEN SATURATION: 97 % | WEIGHT: 112.25 LBS | HEIGHT: 61 IN

## 2021-05-12 DIAGNOSIS — Z00.00 ENCOUNTER FOR GENERAL ADULT MEDICAL EXAMINATION W/OUT ABNORMAL FINDINGS: ICD-10-CM

## 2021-05-12 DIAGNOSIS — Z01.419 ENCOUNTER FOR GYNECOLOGICAL EXAMINATION (GENERAL) (ROUTINE) W/OUT ABNORMAL FINDINGS: ICD-10-CM

## 2021-05-12 PROCEDURE — G0101: CPT

## 2021-05-14 ENCOUNTER — APPOINTMENT (OUTPATIENT)
Dept: INTERNAL MEDICINE | Facility: CLINIC | Age: 71
End: 2021-05-14
Payer: MEDICARE

## 2021-05-14 VITALS — DIASTOLIC BLOOD PRESSURE: 84 MMHG | SYSTOLIC BLOOD PRESSURE: 140 MMHG

## 2021-05-14 DIAGNOSIS — B97.7 PAPILLOMAVIRUS AS THE CAUSE OF DISEASES CLASSIFIED ELSEWHERE: ICD-10-CM

## 2021-05-14 PROCEDURE — 99214 OFFICE O/P EST MOD 30 MIN: CPT

## 2021-05-14 RX ORDER — PEN NEEDLE, DIABETIC 30 GX5/16"
31G X 5 MM NEEDLE, DISPOSABLE MISCELLANEOUS
Qty: 100 | Refills: 3 | Status: DISCONTINUED | COMMUNITY
Start: 2018-05-15 | End: 2021-05-14

## 2021-05-25 LAB
CYTOLOGY CVX/VAG DOC THIN PREP: ABNORMAL
HPV HIGH+LOW RISK DNA PNL CVX: NOT DETECTED

## 2021-06-03 ENCOUNTER — APPOINTMENT (OUTPATIENT)
Dept: HEART AND VASCULAR | Facility: CLINIC | Age: 71
End: 2021-06-03

## 2021-06-21 ENCOUNTER — RX RENEWAL (OUTPATIENT)
Age: 71
End: 2021-06-21

## 2021-07-20 PROBLEM — Z01.419 WELL WOMAN EXAM WITH ROUTINE GYNECOLOGICAL EXAM: Status: ACTIVE | Noted: 2019-08-16

## 2021-07-20 NOTE — HISTORY OF PRESENT ILLNESS
[FreeTextEntry1] : FLORI  PRESENTS FOR SURVEILLANCE PAP.   HAD A PAP WITH DR. BAEZ ON JUNE 24, 2020.  NO COMPLAINTS.  \par

## 2021-08-16 ENCOUNTER — APPOINTMENT (OUTPATIENT)
Dept: MAMMOGRAPHY | Facility: HOSPITAL | Age: 71
End: 2021-08-16

## 2021-08-18 DIAGNOSIS — Z12.39 ENCOUNTER FOR OTHER SCREENING FOR MALIGNANT NEOPLASM OF BREAST: ICD-10-CM

## 2021-09-01 ENCOUNTER — RESULT REVIEW (OUTPATIENT)
Age: 71
End: 2021-09-01

## 2021-09-01 ENCOUNTER — APPOINTMENT (OUTPATIENT)
Dept: ULTRASOUND IMAGING | Facility: HOSPITAL | Age: 71
End: 2021-09-01
Payer: MEDICARE

## 2021-09-01 ENCOUNTER — NON-APPOINTMENT (OUTPATIENT)
Age: 71
End: 2021-09-01

## 2021-09-01 ENCOUNTER — APPOINTMENT (OUTPATIENT)
Dept: MAMMOGRAPHY | Facility: HOSPITAL | Age: 71
End: 2021-09-01
Payer: MEDICARE

## 2021-09-01 ENCOUNTER — OUTPATIENT (OUTPATIENT)
Dept: OUTPATIENT SERVICES | Facility: HOSPITAL | Age: 71
LOS: 1 days | End: 2021-09-01
Payer: MEDICARE

## 2021-09-01 PROCEDURE — 77067 SCR MAMMO BI INCL CAD: CPT

## 2021-09-01 PROCEDURE — 77063 BREAST TOMOSYNTHESIS BI: CPT | Mod: 26

## 2021-09-01 PROCEDURE — 77067 SCR MAMMO BI INCL CAD: CPT | Mod: 26

## 2021-09-01 PROCEDURE — 77063 BREAST TOMOSYNTHESIS BI: CPT

## 2021-09-13 ENCOUNTER — NON-APPOINTMENT (OUTPATIENT)
Age: 71
End: 2021-09-13

## 2021-09-13 ENCOUNTER — APPOINTMENT (OUTPATIENT)
Dept: OPHTHALMOLOGY | Facility: CLINIC | Age: 71
End: 2021-09-13
Payer: MEDICARE

## 2021-09-13 PROCEDURE — 92083 EXTENDED VISUAL FIELD XM: CPT

## 2021-09-13 PROCEDURE — 92014 COMPRE OPH EXAM EST PT 1/>: CPT

## 2021-09-13 PROCEDURE — 92250 FUNDUS PHOTOGRAPHY W/I&R: CPT

## 2021-09-13 PROCEDURE — 92020 GONIOSCOPY: CPT

## 2021-10-28 ENCOUNTER — APPOINTMENT (OUTPATIENT)
Dept: INTERNAL MEDICINE | Facility: CLINIC | Age: 71
End: 2021-10-28
Payer: MEDICARE

## 2021-10-28 VITALS
WEIGHT: 110 LBS | DIASTOLIC BLOOD PRESSURE: 73 MMHG | HEIGHT: 62 IN | OXYGEN SATURATION: 99 % | BODY MASS INDEX: 20.24 KG/M2 | SYSTOLIC BLOOD PRESSURE: 119 MMHG | HEART RATE: 79 BPM

## 2021-10-28 PROCEDURE — G0439: CPT

## 2021-10-28 PROCEDURE — 36415 COLL VENOUS BLD VENIPUNCTURE: CPT

## 2021-10-28 PROCEDURE — 99212 OFFICE O/P EST SF 10 MIN: CPT | Mod: GC,25

## 2021-10-28 RX ORDER — GLYCERIN/MIN OIL/POLYCARBOPHIL
GEL WITH APPLICATOR (GRAM) VAGINAL
Qty: 1 | Refills: 3 | Status: DISCONTINUED | COMMUNITY
Start: 2020-06-24 | End: 2021-10-28

## 2021-11-03 LAB
25(OH)D3 SERPL-MCNC: 40.9 NG/ML
ALBUMIN SERPL ELPH-MCNC: 4.9 G/DL
ALP BLD-CCNC: 96 U/L
ALT SERPL-CCNC: 21 U/L
ANION GAP SERPL CALC-SCNC: 17 MMOL/L
AST SERPL-CCNC: 34 U/L
BILIRUB SERPL-MCNC: 0.4 MG/DL
BUN SERPL-MCNC: 10 MG/DL
CALCIUM SERPL-MCNC: 10 MG/DL
CHLORIDE SERPL-SCNC: 104 MMOL/L
CHOLEST SERPL-MCNC: 309 MG/DL
CO2 SERPL-SCNC: 22 MMOL/L
CREAT SERPL-MCNC: 0.58 MG/DL
GLUCOSE SERPL-MCNC: 81 MG/DL
HDLC SERPL-MCNC: 154 MG/DL
LDLC SERPL CALC-MCNC: 133 MG/DL
NONHDLC SERPL-MCNC: 155 MG/DL
POTASSIUM SERPL-SCNC: 4.8 MMOL/L
PROT SERPL-MCNC: 7 G/DL
SODIUM SERPL-SCNC: 143 MMOL/L
TRIGL SERPL-MCNC: 111 MG/DL

## 2021-12-14 ENCOUNTER — OUTPATIENT (OUTPATIENT)
Dept: OUTPATIENT SERVICES | Facility: HOSPITAL | Age: 71
LOS: 1 days | End: 2021-12-14
Payer: MEDICARE

## 2021-12-14 ENCOUNTER — APPOINTMENT (OUTPATIENT)
Dept: INTERNAL MEDICINE | Facility: CLINIC | Age: 71
End: 2021-12-14
Payer: MEDICARE

## 2021-12-14 ENCOUNTER — APPOINTMENT (OUTPATIENT)
Dept: RADIOLOGY | Facility: HOSPITAL | Age: 71
End: 2021-12-14
Payer: MEDICARE

## 2021-12-14 VITALS
DIASTOLIC BLOOD PRESSURE: 81 MMHG | HEART RATE: 66 BPM | SYSTOLIC BLOOD PRESSURE: 150 MMHG | RESPIRATION RATE: 15 BRPM | WEIGHT: 111 LBS | OXYGEN SATURATION: 100 % | TEMPERATURE: 98 F | BODY MASS INDEX: 20.3 KG/M2

## 2021-12-14 PROCEDURE — 77085 DXA BONE DENSITY AXL VRT FX: CPT | Mod: 26

## 2021-12-14 PROCEDURE — 99214 OFFICE O/P EST MOD 30 MIN: CPT | Mod: GC

## 2021-12-14 PROCEDURE — 77085 DXA BONE DENSITY AXL VRT FX: CPT

## 2022-02-11 ENCOUNTER — APPOINTMENT (OUTPATIENT)
Dept: INTERNAL MEDICINE | Facility: CLINIC | Age: 72
End: 2022-02-11
Payer: MEDICARE

## 2022-02-11 VITALS
HEIGHT: 62 IN | HEART RATE: 70 BPM | SYSTOLIC BLOOD PRESSURE: 114 MMHG | WEIGHT: 110 LBS | BODY MASS INDEX: 20.24 KG/M2 | DIASTOLIC BLOOD PRESSURE: 69 MMHG | OXYGEN SATURATION: 98 % | TEMPERATURE: 98.3 F

## 2022-02-11 DIAGNOSIS — L90.8 OTHER ATROPHIC DISORDERS OF SKIN: ICD-10-CM

## 2022-02-11 DIAGNOSIS — R45.89 OTHER SYMPTOMS AND SIGNS INVOLVING EMOTIONAL STATE: ICD-10-CM

## 2022-02-11 PROCEDURE — 99214 OFFICE O/P EST MOD 30 MIN: CPT | Mod: GC

## 2022-03-04 ENCOUNTER — NON-APPOINTMENT (OUTPATIENT)
Age: 72
End: 2022-03-04

## 2022-03-14 ENCOUNTER — APPOINTMENT (OUTPATIENT)
Dept: OPHTHALMOLOGY | Facility: CLINIC | Age: 72
End: 2022-03-14

## 2022-03-21 ENCOUNTER — APPOINTMENT (OUTPATIENT)
Age: 72
End: 2022-03-21
Payer: MEDICARE

## 2022-03-21 VITALS
RESPIRATION RATE: 14 BRPM | TEMPERATURE: 96.6 F | HEART RATE: 81 BPM | OXYGEN SATURATION: 98 % | WEIGHT: 112 LBS | SYSTOLIC BLOOD PRESSURE: 150 MMHG | BODY MASS INDEX: 20.61 KG/M2 | DIASTOLIC BLOOD PRESSURE: 90 MMHG | HEIGHT: 62 IN

## 2022-03-21 DIAGNOSIS — Z12.11 ENCOUNTER FOR SCREENING FOR MALIGNANT NEOPLASM OF COLON: ICD-10-CM

## 2022-03-21 PROCEDURE — 99204 OFFICE O/P NEW MOD 45 MIN: CPT

## 2022-03-22 NOTE — ASSESSMENT
[FreeTextEntry1] : Average risk index screening colonoscopy\par \par Obtain pre-op labs (CBC + COMP)\par Schedule colonoscopy at [facility]\par Bowel prep provided\par r/b/a/i discussed and patient agreeable\par Pt was advised that an escort is needed to  from procedure\par Will f/u post procedure\par \par Chronic constipation\par - Continue magnesium supplements daily\par - Increase daily fluid intake

## 2022-03-22 NOTE — HISTORY OF PRESENT ILLNESS
[de-identified] : 66 yo F with h/o HTN, HLD, osteoporosis presents to clinic for colon cancer screening and symptoms of constipation.  Pt states that she takes OTC Oxy powder daily (magnesium supplement) which has significantly improved her chronic constipation. Pt stools are currently formed and there is no associated straining. Pt states that her acid reflux has resolved, not taking any medications. Diet consists of fruits, vegetables, never fried foods, lots of water.  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 Recommended repeat in 2019\par EGD 3/4/3016: Normal esophagus, erosive gastritis; negative HP. \par

## 2022-03-22 NOTE — PHYSICAL EXAM
[General Appearance - Alert] : alert [Sclera] : the sclera and conjunctiva were normal [Outer Ear] : the ears and nose were normal in appearance [Abdomen Soft] : soft [Abdomen Tenderness] : non-tender [Abnormal Walk] : normal gait [Skin Color & Pigmentation] : normal skin color and pigmentation [Cranial Nerves] : cranial nerves 2-12 were intact [Oriented To Time, Place, And Person] : oriented to person, place, and time

## 2022-04-07 ENCOUNTER — APPOINTMENT (OUTPATIENT)
Dept: OPHTHALMOLOGY | Facility: CLINIC | Age: 72
End: 2022-04-07
Payer: MEDICARE

## 2022-04-07 ENCOUNTER — NON-APPOINTMENT (OUTPATIENT)
Age: 72
End: 2022-04-07

## 2022-04-07 PROCEDURE — 92083 EXTENDED VISUAL FIELD XM: CPT

## 2022-04-07 PROCEDURE — 92133 CPTRZD OPH DX IMG PST SGM ON: CPT

## 2022-04-07 PROCEDURE — 92012 INTRM OPH EXAM EST PATIENT: CPT

## 2022-04-08 ENCOUNTER — APPOINTMENT (OUTPATIENT)
Dept: INTERNAL MEDICINE | Facility: CLINIC | Age: 72
End: 2022-04-08
Payer: MEDICARE

## 2022-04-08 VITALS
HEART RATE: 76 BPM | WEIGHT: 115 LBS | OXYGEN SATURATION: 98 % | TEMPERATURE: 97 F | BODY MASS INDEX: 21.03 KG/M2 | RESPIRATION RATE: 14 BRPM | DIASTOLIC BLOOD PRESSURE: 70 MMHG | SYSTOLIC BLOOD PRESSURE: 134 MMHG

## 2022-04-08 PROCEDURE — 99214 OFFICE O/P EST MOD 30 MIN: CPT

## 2022-04-08 NOTE — END OF VISIT
[FreeTextEntry3] : 72 yo female with HTN, GERD, osteoporosis, HPV, anxiety here for f/u\par \par 1) HTN - chronic, stable, c/w amlodipine\par 2) anxiety - chronic, declines medication\par 3) osteoporosis - non compliant with Andronate due to "too many extra pills", counseled compliance

## 2022-04-08 NOTE — HISTORY OF PRESENT ILLNESS
[de-identified] : 71F w/ PMHx anxiety, HTN, HLD, and osteoporosis presents for 2 month follow-up. She reports no interval events since her last visit to clinic, no acute complaints. She continues to experience depression and anxiety surrounding her family's care of her mother, as well as feeling she has become more forgetful and not as sharp mentally. Patient is now interested in trying therapy. She says that exercise and taking walks helps with her anxiety as well. She reports good compliance with her amlodipine 5 daily. However, she is non-compliant with her alendronate for osteoporosis due to not wanting to take any extra meds. Denies f/c, headache, blurry vision, CP, SOB, n/v/d/c.

## 2022-04-08 NOTE — ASSESSMENT
[FreeTextEntry1] : 71F w/ PMHx anxiety, HTN, HLD, and osteoporosis presents for 2 month follow-up, with no acute complaints.\par \par #Anxiety/Depression\par - Patient continues to complain of life stressors causing her depression and anxiety which exacerbate her forgetfulness, but she now is willing to try therapy again\par - [facility] referral for CBT\par - Not interested in anti-anxiety medications\par \par #HTN\par - Normotensive today 134/70\par - C/w amlodipine 5 daily\par \par #Osteoporosis\par - Diagnosed on DEXA\par - Not taking her alendronate recently\par - Encouraged patient to continue to take the medication\par \par #HCM\par - Scheduled for colonoscopy June 2\par - UTD on vaccines, does not want shingles vaccine

## 2022-04-08 NOTE — PHYSICAL EXAM
[Normal] : normal gait, coordination grossly intact, no focal deficits and deep tendon reflexes were 2+ and symmetric [de-identified] : Anxious

## 2022-04-11 PROBLEM — Z11.59 SCREENING FOR VIRAL DISEASE: Status: ACTIVE | Noted: 2020-06-12

## 2022-04-13 ENCOUNTER — TRANSCRIPTION ENCOUNTER (OUTPATIENT)
Age: 72
End: 2022-04-13

## 2022-04-29 ENCOUNTER — APPOINTMENT (OUTPATIENT)
Dept: DERMATOLOGY | Facility: CLINIC | Age: 72
End: 2022-04-29
Payer: MEDICARE

## 2022-04-29 DIAGNOSIS — L81.4 OTHER MELANIN HYPERPIGMENTATION: ICD-10-CM

## 2022-04-29 DIAGNOSIS — B35.3 TINEA PEDIS: ICD-10-CM

## 2022-04-29 PROCEDURE — 99203 OFFICE O/P NEW LOW 30 MIN: CPT

## 2022-04-29 NOTE — HISTORY OF PRESENT ILLNESS
[FreeTextEntry1] : skin check [de-identified] : skin check\par dark spots arms/legs\par scale on feet

## 2022-04-29 NOTE — ASSESSMENT
[FreeTextEntry1] : lentigines\par sun protection\par \par tinea pedis\par keto cream\par \par discussed tx cosmetic concerns face referred to Kristin Fuller MD

## 2022-05-18 ENCOUNTER — NON-APPOINTMENT (OUTPATIENT)
Age: 72
End: 2022-05-18

## 2022-05-18 ENCOUNTER — APPOINTMENT (OUTPATIENT)
Dept: OBGYN | Facility: CLINIC | Age: 72
End: 2022-05-18
Payer: MEDICARE

## 2022-05-18 VITALS
BODY MASS INDEX: 21.16 KG/M2 | SYSTOLIC BLOOD PRESSURE: 169 MMHG | HEIGHT: 62 IN | WEIGHT: 115 LBS | DIASTOLIC BLOOD PRESSURE: 78 MMHG

## 2022-05-18 PROCEDURE — 99212 OFFICE O/P EST SF 10 MIN: CPT

## 2022-05-18 NOTE — HISTORY OF PRESENT ILLNESS
[Patient reported PAP Smear was normal] : Patient reported PAP Smear was normal [LMP unknown] : LMP unknown [postmenopausal] : postmenopausal [unknown] : Patient is unsure of the date of her LMP [Currently In Menopause] : currently in menopause [Post-Menopause, No Sxs] : post-menopausal, currently without symptoms [FreeTextEntry1] : FLORI HUGHES IS A 71 year OLD WHO PRESENTS FOR AN ANNUAL GYN EXAM.  SHE IS WITHOUT COMPLAINTS.\par  [Mammogramdate] : 09/21 [BreastSonogramDate] : 09/20 [PapSmeardate] : 05/21 [ColonoscopyDate] : 07/14

## 2022-05-24 ENCOUNTER — NON-APPOINTMENT (OUTPATIENT)
Age: 72
End: 2022-05-24

## 2022-05-31 LAB
HPV 16 E6+E7 MRNA CVX QL NAA+PROBE: NOT DETECTED
HPV18+45 E6+E7 MRNA CVX QL NAA+PROBE: NOT DETECTED

## 2022-06-23 ENCOUNTER — APPOINTMENT (OUTPATIENT)
Dept: INTERNAL MEDICINE | Facility: CLINIC | Age: 72
End: 2022-06-23
Payer: MEDICARE

## 2022-06-23 VITALS
HEART RATE: 67 BPM | DIASTOLIC BLOOD PRESSURE: 78 MMHG | RESPIRATION RATE: 16 BRPM | WEIGHT: 111 LBS | HEIGHT: 62 IN | BODY MASS INDEX: 20.43 KG/M2 | SYSTOLIC BLOOD PRESSURE: 158 MMHG | OXYGEN SATURATION: 100 %

## 2022-06-23 PROCEDURE — 36415 COLL VENOUS BLD VENIPUNCTURE: CPT

## 2022-06-23 PROCEDURE — 99214 OFFICE O/P EST MOD 30 MIN: CPT | Mod: 25

## 2022-06-23 NOTE — HISTORY OF PRESENT ILLNESS
[FreeTextEntry1] : rash [de-identified] : 71F w/ PMHx anxiety, HTN, HLD, and osteoporosis presents for follow up and rash. Patient says that last week she started developing a rash in her arms, thighs and chest. Rash is on the medial upper arm and lateral chest b/l as well as medial thighs where they rub against each other. She has been going to the gym and says she sweats and rubs those areas against each other. She recently came back from an emergency trip to [facility] where her mother just passed away. \par She still does not take her alendronate but is compliant with her amlodipine. She is interested in starting a medication for cholesterol. She recently had a pap smear done with DANIEL and was told to repeat in 1 year. \jung Has been feeling increasingly anxious and depressed since losing her mother. She is worreid about her own health and is trying to be healthy so that she will not get sick. She does not have any family or children here and says she has limited support but does have a freiend in [place] that she talks to and is trying to date to find a relationship.

## 2022-06-23 NOTE — PHYSICAL EXAM
[Normal] : normal gait, coordination grossly intact, no focal deficits and deep tendon reflexes were 2+ and symmetric [de-identified] : Anxious appearing [de-identified] : Erythema present on medial arm and lateral chest where arms touch b/l. Erythema present on medial thighs wehre thighs rub together.

## 2022-06-23 NOTE — END OF VISIT
[FreeTextEntry3] : 69 yo female with hx anxiety, HPL, osteoporosis, HTN here for f/u and c/o rash of axillary, UE and inguinal regions. Goes to gym a lot.\par \par erythematous patches of b/l axilla, round 3 x 3 cm patch of RUE and 2 cm x cm round patch of LUE\par \par 1) rash - likely 2/ fungal, recommend try clotrimazole\par 2) HTN - chronic, above goal, will increase amlodipine to 10 mg.\par 3) HPL - chornic, above goal, will start statin

## 2022-06-23 NOTE — PLAN
[FreeTextEntry1] : #Rash- Patient with rash that appears fungal in nature around areas of skin contact\par - Clotrimazole 1% cream for 7-10d given\par \par #HTN- Patient with persistently elevated bp on amlodipine 5\par - Increased to 10mg qd\par \par #HLD- Patient with previously elevated cholesterol, now interested in starting statin\par - Start lipitor 20 qhs\par \par #osteoporosis- patient still not taking alendronate\par \par #HCM\par - F/u A1c\par - colonoscopy scheduled for August\par - pap smear abnormal with LGSIL, will repeat in 1 year

## 2022-06-24 LAB
CHOLEST SERPL-MCNC: 284 MG/DL
ESTIMATED AVERAGE GLUCOSE: 103 MG/DL
HBA1C MFR BLD HPLC: 5.2 %
HDLC SERPL-MCNC: 131 MG/DL
LDLC SERPL CALC-MCNC: 136 MG/DL
NONHDLC SERPL-MCNC: 153 MG/DL
TRIGL SERPL-MCNC: 83 MG/DL

## 2022-07-01 ENCOUNTER — APPOINTMENT (OUTPATIENT)
Dept: DERMATOLOGY | Facility: CLINIC | Age: 72
End: 2022-07-01

## 2022-07-01 PROCEDURE — 99214 OFFICE O/P EST MOD 30 MIN: CPT

## 2022-07-14 ENCOUNTER — APPOINTMENT (OUTPATIENT)
Age: 72
End: 2022-07-14

## 2022-07-15 ENCOUNTER — APPOINTMENT (OUTPATIENT)
Dept: INTERNAL MEDICINE | Facility: CLINIC | Age: 72
End: 2022-07-15

## 2022-07-15 PROCEDURE — 99213 OFFICE O/P EST LOW 20 MIN: CPT | Mod: GC

## 2022-07-18 ENCOUNTER — APPOINTMENT (OUTPATIENT)
Dept: INTERNAL MEDICINE | Facility: CLINIC | Age: 72
End: 2022-07-18

## 2022-07-18 VITALS
BODY MASS INDEX: 20.8 KG/M2 | SYSTOLIC BLOOD PRESSURE: 128 MMHG | DIASTOLIC BLOOD PRESSURE: 67 MMHG | OXYGEN SATURATION: 98 % | HEART RATE: 70 BPM | WEIGHT: 113 LBS | TEMPERATURE: 97.8 F | HEIGHT: 62 IN

## 2022-07-18 PROCEDURE — 99213 OFFICE O/P EST LOW 20 MIN: CPT | Mod: GC

## 2022-07-18 NOTE — ASSESSMENT
[FreeTextEntry1] : Patient is 72 yo F with a past medical Hx of HTN, HLD, and osteoporosis, who presents for a persistent rash. Rash has been ongoing since her return from a trip to [facility] last month. The rash is maculopapular, nonpruritic, and located on her bilateral arms, thighs, and L flank.\par \par #Rash\par Patient presenting with nonpruritic maculopapular rash on her b/l arms, thighs, and L flank. S/P antifungal and cortisone cream with minimal improvement. \par - Allergy and immunology referral\par - Return to dermatology for biopsy\par \par #HTN\par - c/w Amlodipine 5 mg PO QD\par \par #Health care maintenance\par - Colonoscopy scheduled for August\par - Pap smear abnormal with LGSIL, will repeat in 1 year\par

## 2022-07-18 NOTE — ASSESSMENT
[FreeTextEntry1] : Patient is 70 yo F with a past medical Hx of HTN, HLD, and osteoporosis, who presents for a persistent rash. Rash has been ongoing since her return from a trip to [facility] last month. The rash is maculopapular, nonpruritic, and located on her bilateral arms, thighs, and L flank.\par \par #Rash\par Patient presenting with nonpruritic maculopapular rash on her b/l arms, thighs, and L flank. S/P antifungal and cortisone cream with minimal improvement. \par - Allergy and immunology referral\par - Return to dermatology for biopsy\par \par #HTN\par - c/w Amlodipine 5 mg PO QD\par \par #Health care maintenance\par - Colonoscopy scheduled for August\par - Pap smear abnormal with LGSIL, will repeat in 1 year\par

## 2022-07-18 NOTE — HISTORY OF PRESENT ILLNESS
[FreeTextEntry1] : Rash [de-identified] : Patient is 72 yo F with a past medical Hx of HTN, HLD, and osteoporosis, who presents for a persistent rash. Rash has been ongoing since her return from a trip to [facility] last month. The rash is maculopapular, nonpruritic, and located on her bilateral arms, thighs, and L flank. She has already seen a dermatologist who tried both an antifungal and cortisone cream. The cortisone cream seems to have helped her medial R thigh as well as her R arm, but now the rash has spread to the back of both of her thighs. Patient has not had any changes to her medication or nutrition. Patient does endorse sun exposure prior to developing the rash. Patient wonders if the rash may be allergic in nature. During her last visit to the dermatologist, the dermatologist recommended a skin biopsy if she were to fail treatment with the cortisone cream.

## 2022-07-18 NOTE — END OF VISIT
[] : Resident [FreeTextEntry3] :  71 year old F presenting for f/u of rash. First noted bumps on arms near axillae in early  after returning from a trip to [facility] to visit her ailing mother who unfortunately . Pt reports no exposures on that trip other than swimming in the ocean, no sexual contact. Rash has since expanded to arms and legs, in particular medial and posterior thighs. No itching or pain. Tried topical steroids w/ some improvement as well as topical antifungals. Rash continues to spread on her legs, wonders if it is related to an allergy. Saw dermatology who recommended bx. on exam, well appearing female NAD, scattered blanching papules on arms, legs, some on torso. coalescing into erythematous well circumscribed plaques on medial/posterior thighs. Advised f/u with dermatology for bx to clarify diagnosis given worsening symptoms, referred to allergy as well per her request.

## 2022-07-18 NOTE — PHYSICAL EXAM
[Normal] : affect was normal and insight and judgment were intact [de-identified] : maculopapular, nonpruritic, and located on her bilateral arms, thighs, and L flank

## 2022-07-18 NOTE — REVIEW OF SYSTEMS
[Skin Rash] : skin rash [Negative] : Heme/Lymph [de-identified] : maculopapular, nonpruritic, and located on her bilateral arms, thighs, and L flank.

## 2022-07-18 NOTE — REVIEW OF SYSTEMS
[Skin Rash] : skin rash [Negative] : Heme/Lymph [de-identified] : maculopapular, nonpruritic, and located on her bilateral arms, thighs, and L flank.

## 2022-07-18 NOTE — END OF VISIT
[] : Resident [FreeTextEntry3] :  71 year old F presenting for f/u of rash. First noted bumps on arms near axillae in early  after returning from a trip to [facility] to visit her ailing mother who unfortunately . Pt reports no exposures on that trip other than swimming in the ocean, no sexual contact. Rash has since expanded to arms and legs, in particular medial and posterior thighs. No itching or pain. Tried topical steroids w/ some improvement as well as topical antifungals. Rash continues to spread on her legs, wonders if it is related to an allergy. Saw dermatology who recommended bx. on exam, well appearing female NAD, scattered blanching papules on arms, legs, some on torso. coalescing into erythematous well circumscribed plaques on medial/posterior thighs. Advised f/u with dermatology for bx to clarify diagnosis given worsening symptoms, referred to allergy as well per her request.

## 2022-07-18 NOTE — HISTORY OF PRESENT ILLNESS
[FreeTextEntry1] : Rash [de-identified] : Patient is 72 yo F with a past medical Hx of HTN, HLD, and osteoporosis, who presents for a persistent rash. Rash has been ongoing since her return from a trip to [facility] last month. The rash is maculopapular, nonpruritic, and located on her bilateral arms, thighs, and L flank. She has already seen a dermatologist who tried both an antifungal and cortisone cream. The cortisone cream seems to have helped her medial R thigh as well as her R arm, but now the rash has spread to the back of both of her thighs. Patient has not had any changes to her medication or nutrition. Patient does endorse sun exposure prior to developing the rash. Patient wonders if the rash may be allergic in nature. During her last visit to the dermatologist, the dermatologist recommended a skin biopsy if she were to fail treatment with the cortisone cream.

## 2022-07-18 NOTE — PHYSICAL EXAM
[Normal] : affect was normal and insight and judgment were intact [de-identified] : maculopapular, nonpruritic, and located on her bilateral arms, thighs, and L flank

## 2022-07-25 NOTE — PHYSICAL EXAM
[FreeTextEntry3] : Erythematous papules, coalescing into plaques on b/l inner upper arms, b/l thighs medial, b/l arms/wrists, legs, flanks

## 2022-07-25 NOTE — HISTORY OF PRESENT ILLNESS
[FreeTextEntry1] : rash [de-identified] : Patient is 72 yo F with a past medical Hx of HTN, HLD, and osteoporosis, who presents for a persistent rash. Rash has been ongoing since her return from a trip to [facility] last month. The rash is maculopapular, nonpruritic, and located on her bilateral arms, thighs, and L flank. She has already seen a dermatologist who tried both an antifungal and cortisone cream. The cortisone cream seems to have helped her medial R thigh as well as her R arm, but now the rash has spread to the back of both of her thighs. Patient has not had any changes to her medication or nutrition. Patient does endorse sun exposure prior to developing the rash. Patient wonders if the rash may be allergic in nature. During her last visit to the dermatologist, the dermatologist recommended a skin biopsy if she were to fail treatment with the cortisone cream.

## 2022-08-01 ENCOUNTER — APPOINTMENT (OUTPATIENT)
Dept: DERMATOLOGY | Facility: CLINIC | Age: 72
End: 2022-08-01

## 2022-08-11 ENCOUNTER — LABORATORY RESULT (OUTPATIENT)
Age: 72
End: 2022-08-11

## 2022-08-11 ENCOUNTER — APPOINTMENT (OUTPATIENT)
Dept: DERMATOLOGY | Facility: CLINIC | Age: 72
End: 2022-08-11

## 2022-08-11 DIAGNOSIS — D48.9 NEOPLASM OF UNCERTAIN BEHAVIOR, UNSPECIFIED: ICD-10-CM

## 2022-08-11 PROCEDURE — 99214 OFFICE O/P EST MOD 30 MIN: CPT | Mod: 25

## 2022-08-11 PROCEDURE — 11104 PUNCH BX SKIN SINGLE LESION: CPT

## 2022-08-11 NOTE — HISTORY OF PRESENT ILLNESS
[FreeTextEntry1] : FU rash [de-identified] : saw Dr Alonzo early August for asymptomatic rash that started after going to [place] in May\par told to see allergist for testing\par had lots of bloodwork done, all negative told needs a derm\par given ranitidine, no help\par concerned something in liver or ovaries\par not itchy, no joint pain or fever otherwise well \par not moving around, getting new spots on buttock\par No new medications but did take a few mos of supplement as recommended by a friend - cera Lift 1.5 mos and PCO pro 3 mos\par also takes numerous supplements including mushrooms, clear lungs, fibrenza, cardiovascular detox, organic breem, zylfamend etc\par had telehealth scheduled with Dr Alonzo but canceled because had allergy appointment at the same time

## 2022-08-11 NOTE — PHYSICAL EXAM
[Alert] : alert [Oriented x 3] : ~L oriented x 3 [Well Nourished] : well nourished [Conjunctiva Non-injected] : conjunctiva non-injected [No Visual Lymphadenopathy] : no visual  lymphadenopathy [No Clubbing] : no clubbing [No Edema] : no edema [No Bromhidrosis] : no bromhidrosis [No Chromhidrosis] : no chromhidrosis [FreeTextEntry3] : pink papules and plaques, smooth and coalescing on medial thighs, extensor elbows, buttocks, wrists\par some annular plaques in axilla

## 2022-09-01 ENCOUNTER — RESULT REVIEW (OUTPATIENT)
Age: 72
End: 2022-09-01

## 2022-09-01 ENCOUNTER — APPOINTMENT (OUTPATIENT)
Dept: MAMMOGRAPHY | Facility: HOSPITAL | Age: 72
End: 2022-09-01

## 2022-09-01 ENCOUNTER — OUTPATIENT (OUTPATIENT)
Dept: OUTPATIENT SERVICES | Facility: HOSPITAL | Age: 72
LOS: 1 days | End: 2022-09-01
Payer: MEDICARE

## 2022-09-01 PROCEDURE — 77067 SCR MAMMO BI INCL CAD: CPT | Mod: 26

## 2022-09-01 PROCEDURE — 77067 SCR MAMMO BI INCL CAD: CPT

## 2022-09-01 PROCEDURE — 77063 BREAST TOMOSYNTHESIS BI: CPT

## 2022-09-01 PROCEDURE — 77063 BREAST TOMOSYNTHESIS BI: CPT | Mod: 26

## 2022-09-02 ENCOUNTER — APPOINTMENT (OUTPATIENT)
Dept: INTERNAL MEDICINE | Facility: CLINIC | Age: 72
End: 2022-09-02

## 2022-09-02 ENCOUNTER — NON-APPOINTMENT (OUTPATIENT)
Age: 72
End: 2022-09-02

## 2022-09-02 VITALS
HEART RATE: 71 BPM | BODY MASS INDEX: 20.8 KG/M2 | HEIGHT: 62 IN | WEIGHT: 113 LBS | DIASTOLIC BLOOD PRESSURE: 69 MMHG | OXYGEN SATURATION: 98 % | SYSTOLIC BLOOD PRESSURE: 127 MMHG

## 2022-09-02 DIAGNOSIS — E78.5 HYPERLIPIDEMIA, UNSPECIFIED: ICD-10-CM

## 2022-09-02 PROCEDURE — 99214 OFFICE O/P EST MOD 30 MIN: CPT

## 2022-09-07 ENCOUNTER — APPOINTMENT (OUTPATIENT)
Dept: DERMATOLOGY | Facility: CLINIC | Age: 72
End: 2022-09-07

## 2022-09-07 PROCEDURE — 99214 OFFICE O/P EST MOD 30 MIN: CPT

## 2022-09-07 NOTE — HISTORY OF PRESENT ILLNESS
[FreeTextEntry1] : MIKE CHAHAL [de-identified] : bx proven GA - received bx results 2 weeks ago and started topical steroid\par was supposed to come for FU in 3 mos but scheduled today because not going away\par concerned about cause of GA\par no smoking, otherwise feels well\par recent mammogram wnl\par has not done colonoscopy\par LSIL on pap smear - FU 5/23\par \par allergist did susie, rf, serum IF and spep

## 2022-09-12 ENCOUNTER — NON-APPOINTMENT (OUTPATIENT)
Age: 72
End: 2022-09-12

## 2022-09-15 LAB
ALBUPE: 19.1 %
ALPHA1UPE: 37.6 %
ALPHA2UPE: 19.1 %
BETAUPE: 15.5 %
GAMMAUPE: 8.7 %
IGA 24H UR QL IFE: NORMAL
KAPPA LC 24H UR QL: NORMAL
PROT PATTERN 24H UR ELPH-IMP: NORMAL
PROT UR-MCNC: 8 MG/DL
PROT UR-MCNC: 8 MG/DL

## 2022-09-19 ENCOUNTER — RX RENEWAL (OUTPATIENT)
Age: 72
End: 2022-09-19

## 2022-09-24 ENCOUNTER — NON-APPOINTMENT (OUTPATIENT)
Age: 72
End: 2022-09-24

## 2022-09-26 ENCOUNTER — APPOINTMENT (OUTPATIENT)
Dept: OPHTHALMOLOGY | Facility: CLINIC | Age: 72
End: 2022-09-26

## 2022-09-26 ENCOUNTER — NON-APPOINTMENT (OUTPATIENT)
Age: 72
End: 2022-09-26

## 2022-09-26 PROCEDURE — 92014 COMPRE OPH EXAM EST PT 1/>: CPT

## 2022-09-26 PROCEDURE — 92020 GONIOSCOPY: CPT

## 2022-09-26 PROCEDURE — 92250 FUNDUS PHOTOGRAPHY W/I&R: CPT

## 2022-09-27 ENCOUNTER — OUTPATIENT (OUTPATIENT)
Dept: OUTPATIENT SERVICES | Facility: HOSPITAL | Age: 72
LOS: 1 days | End: 2022-09-27
Payer: MEDICARE

## 2022-09-27 ENCOUNTER — APPOINTMENT (OUTPATIENT)
Dept: INTERNAL MEDICINE | Facility: CLINIC | Age: 72
End: 2022-09-27

## 2022-09-27 DIAGNOSIS — Z13.1 ENCOUNTER FOR SCREENING FOR DIABETES MELLITUS: ICD-10-CM

## 2022-09-27 PROCEDURE — 71046 X-RAY EXAM CHEST 2 VIEWS: CPT | Mod: 26

## 2022-09-27 PROCEDURE — 71046 X-RAY EXAM CHEST 2 VIEWS: CPT

## 2022-09-30 ENCOUNTER — APPOINTMENT (OUTPATIENT)
Dept: INTERNAL MEDICINE | Facility: CLINIC | Age: 72
End: 2022-09-30

## 2022-09-30 ENCOUNTER — NON-APPOINTMENT (OUTPATIENT)
Age: 72
End: 2022-09-30

## 2022-09-30 VITALS
WEIGHT: 109 LBS | OXYGEN SATURATION: 97 % | SYSTOLIC BLOOD PRESSURE: 113 MMHG | DIASTOLIC BLOOD PRESSURE: 66 MMHG | BODY MASS INDEX: 19.94 KG/M2 | TEMPERATURE: 96.4 F | HEART RATE: 65 BPM

## 2022-09-30 DIAGNOSIS — R06.02 SHORTNESS OF BREATH: ICD-10-CM

## 2022-09-30 PROCEDURE — 99213 OFFICE O/P EST LOW 20 MIN: CPT | Mod: GC

## 2022-10-01 ENCOUNTER — RX RENEWAL (OUTPATIENT)
Age: 72
End: 2022-10-01

## 2022-10-07 ENCOUNTER — APPOINTMENT (OUTPATIENT)
Dept: OBGYN | Facility: CLINIC | Age: 72
End: 2022-10-07

## 2022-10-07 VITALS
HEIGHT: 62 IN | HEART RATE: 77 BPM | WEIGHT: 112 LBS | DIASTOLIC BLOOD PRESSURE: 85 MMHG | BODY MASS INDEX: 20.61 KG/M2 | SYSTOLIC BLOOD PRESSURE: 129 MMHG | OXYGEN SATURATION: 97 %

## 2022-10-07 PROCEDURE — 99213 OFFICE O/P EST LOW 20 MIN: CPT

## 2022-10-12 NOTE — PLAN
[FreeTextEntry1] : Results of the Pap smears and high risk HPV screens were reviewed.  According to the [facility], only hpv co-testing  is indicated at this time.  I informed Ms. Fernandez and she verbalizes understanding at this time.

## 2022-10-12 NOTE — HISTORY OF PRESENT ILLNESS
[FreeTextEntry1] : The Pt is asking for the ultrasound of a pelvic because she has some skin rash/ lesions (started on May 2022) - please see the note of dermatologist on the chart

## 2022-11-04 ENCOUNTER — NON-APPOINTMENT (OUTPATIENT)
Age: 72
End: 2022-11-04

## 2022-11-10 ENCOUNTER — APPOINTMENT (OUTPATIENT)
Age: 72
End: 2022-11-10

## 2022-11-21 ENCOUNTER — RX RENEWAL (OUTPATIENT)
Age: 72
End: 2022-11-21

## 2022-12-07 ENCOUNTER — APPOINTMENT (OUTPATIENT)
Dept: INTERNAL MEDICINE | Facility: CLINIC | Age: 72
End: 2022-12-07

## 2022-12-07 VITALS
BODY MASS INDEX: 21.35 KG/M2 | HEIGHT: 62 IN | TEMPERATURE: 97.7 F | RESPIRATION RATE: 16 BRPM | WEIGHT: 116 LBS | DIASTOLIC BLOOD PRESSURE: 83 MMHG | HEART RATE: 84 BPM | SYSTOLIC BLOOD PRESSURE: 130 MMHG | OXYGEN SATURATION: 99 %

## 2022-12-07 DIAGNOSIS — E78.5 HYPERLIPIDEMIA, UNSPECIFIED: ICD-10-CM

## 2022-12-07 DIAGNOSIS — F41.9 ANXIETY DISORDER, UNSPECIFIED: ICD-10-CM

## 2022-12-07 PROCEDURE — 99397 PER PM REEVAL EST PAT 65+ YR: CPT | Mod: 25,GY,GC

## 2022-12-07 PROCEDURE — 36415 COLL VENOUS BLD VENIPUNCTURE: CPT

## 2022-12-08 NOTE — END OF VISIT
[] : Resident [FreeTextEntry3] : Here for CPE \par Pmhx ot HTN. HLD, osteoporosis and granuloma annular\par Recently had mechanical fall on monday, slight swelling to L knee \par HLD- on atorvastatin 20, will recheck lipid panel, can titrate as needed \par HTN- slightly above goal today however no need for changes, check BMP\par Osteoporosis- stopped taking alendronate, encouraged to restart however patient is hesitant, willing to repeat DEXA, if still with osteoporosis will consider restarting medication

## 2022-12-08 NOTE — HISTORY OF PRESENT ILLNESS
[FreeTextEntry1] : CPE and L knee pain [de-identified] : This is a 71F with HTN, HLD, osteoporosis, and granuloma annulare who presents for CPE and L knee pain. She states that she has been seeing a dermatologist who has started to do light therapy for her granuloma annulare dx. She has scheduled for her to get a colonoscopy on 12/16 and would like a DEXA referral today. For her fall, she says that he happened on Monday (mechanical) with no dizziness/palpitations/CP/diaphoresis. She has been able to walk without too much pain. The swelling has gone down. She needs no refills during this appointment and has no further complaints.

## 2022-12-08 NOTE — ASSESSMENT
[FreeTextEntry1] : This is a 71F with HTN, HLD, osteoporosis, and granuloma annulare who presents for CPE and L knee pain. \par \par #L Knee pain\par Mechanical fall on Monday but able to ambulate well. Per pt, swelling has gone down. \par Plan:\par - Encouraged NSAIDS and RICE\par - If pain is worse then XR L knee\par \par #Osteoporosis\par Last DEXA Dec 2021, showing mild osteo and was recommended to take alendronate but hasn’t been taking it. \par Plan:\par - Placed DEXA referral today\par - Encouraged Vit D supplement; readdress alendronate upon DEXA results \par \par #HLD\par Pt takes lipitor 20 mg QD. Doesn't need refills\par Plan:\par - Rx lipid panel today \par \par #HTN\par Pt takes amlodipine 10 mg QD. Doesn't need refills\par Plan:\par - Rx CMP today\par \par \par #HCM\par UTD on Pap smear, mammo, doesn't want flu vaccine\par Colonoscopy on Dec 16th\par \par RTC in 3 months

## 2022-12-12 LAB
ANION GAP SERPL CALC-SCNC: 20 MMOL/L
BUN SERPL-MCNC: 15 MG/DL
CALCIUM SERPL-MCNC: 10.1 MG/DL
CHLORIDE SERPL-SCNC: 103 MMOL/L
CHOLEST SERPL-MCNC: 277 MG/DL
CO2 SERPL-SCNC: 16 MMOL/L
CREAT SERPL-MCNC: 0.69 MG/DL
EGFR: 93 ML/MIN/1.73M2
GLUCOSE SERPL-MCNC: 91 MG/DL
HDLC SERPL-MCNC: 146 MG/DL
LDLC SERPL CALC-MCNC: 95 MG/DL
NONHDLC SERPL-MCNC: 131 MG/DL
POTASSIUM SERPL-SCNC: 5.4 MMOL/L
SODIUM SERPL-SCNC: 139 MMOL/L
TRIGL SERPL-MCNC: 179 MG/DL

## 2022-12-28 ENCOUNTER — APPOINTMENT (OUTPATIENT)
Dept: DERMATOLOGY | Facility: CLINIC | Age: 72
End: 2022-12-28

## 2023-01-17 ENCOUNTER — APPOINTMENT (OUTPATIENT)
Dept: RADIOLOGY | Facility: HOSPITAL | Age: 73
End: 2023-01-17
Payer: MEDICARE

## 2023-01-17 ENCOUNTER — OUTPATIENT (OUTPATIENT)
Dept: OUTPATIENT SERVICES | Facility: HOSPITAL | Age: 73
LOS: 1 days | End: 2023-01-17
Payer: MEDICARE

## 2023-01-17 PROCEDURE — 77085 DXA BONE DENSITY AXL VRT FX: CPT

## 2023-01-17 PROCEDURE — 77085 DXA BONE DENSITY AXL VRT FX: CPT | Mod: 26

## 2023-01-23 ENCOUNTER — APPOINTMENT (OUTPATIENT)
Dept: DERMATOLOGY | Facility: CLINIC | Age: 73
End: 2023-01-23

## 2023-01-26 ENCOUNTER — NON-APPOINTMENT (OUTPATIENT)
Age: 73
End: 2023-01-26

## 2023-02-09 ENCOUNTER — APPOINTMENT (OUTPATIENT)
Dept: INTERNAL MEDICINE | Facility: CLINIC | Age: 73
End: 2023-02-09
Payer: MEDICARE

## 2023-02-09 VITALS
TEMPERATURE: 98 F | BODY MASS INDEX: 20.24 KG/M2 | RESPIRATION RATE: 20 BRPM | HEART RATE: 72 BPM | SYSTOLIC BLOOD PRESSURE: 114 MMHG | HEIGHT: 62 IN | OXYGEN SATURATION: 98 % | DIASTOLIC BLOOD PRESSURE: 68 MMHG | WEIGHT: 110 LBS

## 2023-02-09 DIAGNOSIS — R53.83 OTHER FATIGUE: ICD-10-CM

## 2023-02-09 PROCEDURE — 99214 OFFICE O/P EST MOD 30 MIN: CPT | Mod: 25

## 2023-02-09 PROCEDURE — 36415 COLL VENOUS BLD VENIPUNCTURE: CPT

## 2023-02-13 LAB
ALBUMIN SERPL ELPH-MCNC: 4.3 G/DL
ALP BLD-CCNC: 95 U/L
ALT SERPL-CCNC: 19 U/L
ANION GAP SERPL CALC-SCNC: 16 MMOL/L
AST SERPL-CCNC: 20 U/L
BASOPHILS # BLD AUTO: 0.04 K/UL
BASOPHILS NFR BLD AUTO: 0.4 %
BILIRUB SERPL-MCNC: 0.2 MG/DL
BUN SERPL-MCNC: 19 MG/DL
CALCIUM SERPL-MCNC: 9.8 MG/DL
CHLORIDE SERPL-SCNC: 106 MMOL/L
CO2 SERPL-SCNC: 19 MMOL/L
CREAT SERPL-MCNC: 0.72 MG/DL
EGFR: 90 ML/MIN/1.73M2
EOSINOPHIL # BLD AUTO: 0.1 K/UL
EOSINOPHIL NFR BLD AUTO: 1 %
GLUCOSE SERPL-MCNC: 103 MG/DL
HCT VFR BLD CALC: 41.8 %
HGB BLD-MCNC: 13.5 G/DL
IMM GRANULOCYTES NFR BLD AUTO: 0.2 %
LYMPHOCYTES # BLD AUTO: 1.92 K/UL
LYMPHOCYTES NFR BLD AUTO: 18.9 %
MAN DIFF?: NORMAL
MCHC RBC-ENTMCNC: 32.3 GM/DL
MCHC RBC-ENTMCNC: 32.7 PG
MCV RBC AUTO: 101.2 FL
MONOCYTES # BLD AUTO: 0.53 K/UL
MONOCYTES NFR BLD AUTO: 5.2 %
NEUTROPHILS # BLD AUTO: 7.54 K/UL
NEUTROPHILS NFR BLD AUTO: 74.3 %
PLATELET # BLD AUTO: 442 K/UL
POTASSIUM SERPL-SCNC: 5.1 MMOL/L
PROT SERPL-MCNC: 6.4 G/DL
RBC # BLD: 4.13 M/UL
RBC # FLD: 12.3 %
SODIUM SERPL-SCNC: 140 MMOL/L
TSH SERPL-ACNC: 1.81 UIU/ML
WBC # FLD AUTO: 10.15 K/UL

## 2023-03-02 ENCOUNTER — APPOINTMENT (OUTPATIENT)
Dept: PULMONOLOGY | Facility: CLINIC | Age: 73
End: 2023-03-02

## 2023-03-05 ENCOUNTER — APPOINTMENT (OUTPATIENT)
Dept: CT IMAGING | Facility: HOSPITAL | Age: 73
End: 2023-03-05

## 2023-03-05 ENCOUNTER — OUTPATIENT (OUTPATIENT)
Dept: OUTPATIENT SERVICES | Facility: HOSPITAL | Age: 73
LOS: 1 days | End: 2023-03-05
Payer: MEDICARE

## 2023-03-05 PROCEDURE — 71250 CT THORAX DX C-: CPT | Mod: 26,MH

## 2023-03-05 PROCEDURE — 71250 CT THORAX DX C-: CPT | Mod: MH

## 2023-03-10 ENCOUNTER — RX RENEWAL (OUTPATIENT)
Age: 73
End: 2023-03-10

## 2023-03-13 ENCOUNTER — APPOINTMENT (OUTPATIENT)
Dept: OPHTHALMOLOGY | Facility: CLINIC | Age: 73
End: 2023-03-13
Payer: MEDICARE

## 2023-03-13 ENCOUNTER — NON-APPOINTMENT (OUTPATIENT)
Age: 73
End: 2023-03-13

## 2023-03-13 PROCEDURE — 92133 CPTRZD OPH DX IMG PST SGM ON: CPT

## 2023-03-13 PROCEDURE — 92083 EXTENDED VISUAL FIELD XM: CPT

## 2023-03-13 PROCEDURE — 92012 INTRM OPH EXAM EST PATIENT: CPT

## 2023-03-16 ENCOUNTER — APPOINTMENT (OUTPATIENT)
Age: 73
End: 2023-03-16

## 2023-03-20 ENCOUNTER — OUTPATIENT (OUTPATIENT)
Dept: OUTPATIENT SERVICES | Facility: HOSPITAL | Age: 73
LOS: 1 days | End: 2023-03-20
Payer: MEDICARE

## 2023-03-20 PROCEDURE — 71046 X-RAY EXAM CHEST 2 VIEWS: CPT | Mod: 26

## 2023-03-20 PROCEDURE — 71046 X-RAY EXAM CHEST 2 VIEWS: CPT

## 2023-03-28 ENCOUNTER — APPOINTMENT (OUTPATIENT)
Dept: INTERNAL MEDICINE | Facility: CLINIC | Age: 73
End: 2023-03-28

## 2023-04-08 ENCOUNTER — OUTPATIENT (OUTPATIENT)
Dept: OUTPATIENT SERVICES | Facility: HOSPITAL | Age: 73
LOS: 1 days | End: 2023-04-08
Payer: MEDICARE

## 2023-04-08 ENCOUNTER — APPOINTMENT (OUTPATIENT)
Dept: CT IMAGING | Facility: HOSPITAL | Age: 73
End: 2023-04-08

## 2023-04-08 PROCEDURE — 71250 CT THORAX DX C-: CPT | Mod: 26,MH

## 2023-04-08 PROCEDURE — 71250 CT THORAX DX C-: CPT | Mod: MH

## 2023-05-19 ENCOUNTER — APPOINTMENT (OUTPATIENT)
Dept: OBGYN | Facility: CLINIC | Age: 73
End: 2023-05-19
Payer: MEDICARE

## 2023-05-19 VITALS
SYSTOLIC BLOOD PRESSURE: 135 MMHG | OXYGEN SATURATION: 97 % | HEART RATE: 74 BPM | BODY MASS INDEX: 19.75 KG/M2 | DIASTOLIC BLOOD PRESSURE: 77 MMHG | WEIGHT: 108 LBS

## 2023-05-19 DIAGNOSIS — Z01.419 ENCOUNTER FOR GYNECOLOGICAL EXAMINATION (GENERAL) (ROUTINE) W/OUT ABNORMAL FINDINGS: ICD-10-CM

## 2023-05-19 DIAGNOSIS — Z12.39 ENCOUNTER FOR OTHER SCREENING FOR MALIGNANT NEOPLASM OF BREAST: ICD-10-CM

## 2023-05-19 DIAGNOSIS — R21 RASH AND OTHER NONSPECIFIC SKIN ERUPTION: ICD-10-CM

## 2023-05-19 PROCEDURE — G0101: CPT | Mod: GA

## 2023-05-19 NOTE — PHYSICAL EXAM
[Appropriately responsive] : appropriately responsive [Alert] : alert [No Acute Distress] : no acute distress [No Lymphadenopathy] : no lymphadenopathy [No Murmurs] : no murmurs [Soft] : soft [Non-tender] : non-tender [Non-distended] : non-distended [No HSM] : No HSM [No Lesions] : no lesions [No Mass] : no mass [Oriented x3] : oriented x3 [FreeTextEntry2] : SKIN: generalized spotty hyperpigmentation [Examination Of The Breasts] : a normal appearance [No Masses] : no breast masses were palpable [Labia Majora] : normal [Labia Minora] : normal [Atrophy] : atrophy [Normal] : normal [Tenderness] : nontender [Enlarged ___ wks] : not enlarged [Uterine Adnexae] : normal [FreeTextEntry9] : Buttocks: scaly, erythematous lesion ~ 2 cm x 2 cm on the right upper buttock at the level of the ASIS

## 2023-05-22 LAB
HPV 16 E6+E7 MRNA CVX QL NAA+PROBE: NOT DETECTED
HPV HIGH+LOW RISK DNA PNL CVX: DETECTED
HPV18+45 E6+E7 MRNA CVX QL NAA+PROBE: NOT DETECTED

## 2023-05-26 ENCOUNTER — NON-APPOINTMENT (OUTPATIENT)
Age: 73
End: 2023-05-26

## 2023-05-26 LAB
HHV SPEC CULT: NORMAL
HSV TYPE 1: NORMAL
HSV TYPE 2: NORMAL

## 2023-05-27 ENCOUNTER — APPOINTMENT (OUTPATIENT)
Dept: CT IMAGING | Facility: HOSPITAL | Age: 73
End: 2023-05-27

## 2023-06-28 ENCOUNTER — APPOINTMENT (OUTPATIENT)
Dept: OBGYN | Facility: CLINIC | Age: 73
End: 2023-06-28
Payer: MEDICARE

## 2023-06-28 VITALS
OXYGEN SATURATION: 96 % | HEART RATE: 64 BPM | DIASTOLIC BLOOD PRESSURE: 71 MMHG | BODY MASS INDEX: 19.75 KG/M2 | WEIGHT: 108 LBS | SYSTOLIC BLOOD PRESSURE: 127 MMHG

## 2023-06-28 DIAGNOSIS — Z86.19 PERSONAL HISTORY OF OTHER INFECTIOUS AND PARASITIC DISEASES: ICD-10-CM

## 2023-06-28 PROCEDURE — 57455 BIOPSY OF CERVIX W/SCOPE: CPT

## 2023-06-28 NOTE — PROCEDURE
[Colposcopy] : Colposcopy  [Consent Obtained] : Consent obtained [Risks] : risks [Benefits] : benefits [Alternatives] : alternatives [Patient] : patient [Infection] : infection [Bleeding] : bleeding [Allergic Reaction] : allergic reaction [ASCUS] : ASCUS [HPV High Risk] : HPV high risk [Ibuprofen ___mg] : Ibuprofen ~Vmg [Colposcopy Adequate] : colposcopy adequate [Pap Performed] : pap performed [SCI Fully Visualized] : SCI fully visualized [ECC Performed] : ECC performed [No Abnormalities] : no abnormalities [Lesion] : lesion seen [Biopsy] : biopsy taken [Hemostasis Obtained] : Hemostasis obtained [Tolerated Well] : the patient tolerated the procedure well [de-identified] : 1 [de-identified] : acetowhite reaction at 12 o'clock [de-identified] : 12 o'clock [de-identified] : LGSIL

## 2023-06-28 NOTE — HISTORY OF PRESENT ILLNESS
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  16/18 & 45 negative.  \par Pap smear results reviewed: 2023 ASCUS, 2022 LGSIL; 2021 neg; 202 ASCUS

## 2023-06-30 ENCOUNTER — NON-APPOINTMENT (OUTPATIENT)
Age: 73
End: 2023-06-30

## 2023-06-30 LAB — CORE LAB BIOPSY: NORMAL

## 2023-07-05 LAB — CYTOLOGY CVX/VAG DOC THIN PREP: ABNORMAL

## 2023-08-14 ENCOUNTER — RX RENEWAL (OUTPATIENT)
Age: 73
End: 2023-08-14

## 2023-08-18 ENCOUNTER — OUTPATIENT (OUTPATIENT)
Dept: OUTPATIENT SERVICES | Facility: HOSPITAL | Age: 73
LOS: 1 days | End: 2023-08-18
Payer: MEDICARE

## 2023-08-18 DIAGNOSIS — R06.00 DYSPNEA, UNSPECIFIED: ICD-10-CM

## 2023-08-18 PROCEDURE — 94070 EVALUATION OF WHEEZING: CPT | Mod: 26

## 2023-08-18 PROCEDURE — 94070 EVALUATION OF WHEEZING: CPT

## 2023-09-07 ENCOUNTER — APPOINTMENT (OUTPATIENT)
Age: 73
End: 2023-09-07
Payer: MEDICARE

## 2023-09-07 PROCEDURE — G0121 COLON CA SCRN NOT HI RSK IND: CPT

## 2023-11-13 ENCOUNTER — RX RENEWAL (OUTPATIENT)
Age: 73
End: 2023-11-13

## 2023-11-20 ENCOUNTER — APPOINTMENT (OUTPATIENT)
Dept: OBGYN | Facility: CLINIC | Age: 73
End: 2023-11-20
Payer: MEDICARE

## 2023-11-20 VITALS
DIASTOLIC BLOOD PRESSURE: 55 MMHG | SYSTOLIC BLOOD PRESSURE: 128 MMHG | BODY MASS INDEX: 19.75 KG/M2 | HEART RATE: 59 BPM | WEIGHT: 108 LBS | OXYGEN SATURATION: 98 %

## 2023-11-20 PROCEDURE — 99212 OFFICE O/P EST SF 10 MIN: CPT

## 2023-11-28 ENCOUNTER — NON-APPOINTMENT (OUTPATIENT)
Age: 73
End: 2023-11-28

## 2023-11-28 LAB — CYTOLOGY CVX/VAG DOC THIN PREP: NORMAL

## 2024-03-04 ENCOUNTER — OUTPATIENT (OUTPATIENT)
Dept: OUTPATIENT SERVICES | Facility: HOSPITAL | Age: 74
LOS: 1 days | End: 2024-03-04
Payer: MEDICARE

## 2024-03-04 PROCEDURE — 71046 X-RAY EXAM CHEST 2 VIEWS: CPT | Mod: 26

## 2024-03-04 PROCEDURE — 71046 X-RAY EXAM CHEST 2 VIEWS: CPT

## 2024-04-08 ENCOUNTER — APPOINTMENT (OUTPATIENT)
Dept: CT IMAGING | Facility: HOSPITAL | Age: 74
End: 2024-04-08

## 2024-04-08 ENCOUNTER — OUTPATIENT (OUTPATIENT)
Dept: OUTPATIENT SERVICES | Facility: HOSPITAL | Age: 74
LOS: 1 days | End: 2024-04-08
Payer: MEDICARE

## 2024-04-08 PROCEDURE — 71271 CT THORAX LUNG CANCER SCR C-: CPT | Mod: 26

## 2024-04-08 PROCEDURE — 71271 CT THORAX LUNG CANCER SCR C-: CPT

## 2024-04-17 ENCOUNTER — APPOINTMENT (OUTPATIENT)
Dept: OPHTHALMOLOGY | Facility: CLINIC | Age: 74
End: 2024-04-17
Payer: MEDICARE

## 2024-04-17 ENCOUNTER — NON-APPOINTMENT (OUTPATIENT)
Age: 74
End: 2024-04-17

## 2024-04-17 PROCEDURE — 92133 CPTRZD OPH DX IMG PST SGM ON: CPT

## 2024-04-17 PROCEDURE — 92083 EXTENDED VISUAL FIELD XM: CPT

## 2024-04-17 PROCEDURE — 92014 COMPRE OPH EXAM EST PT 1/>: CPT

## 2024-05-16 ENCOUNTER — NON-APPOINTMENT (OUTPATIENT)
Age: 74
End: 2024-05-16

## 2024-05-16 ENCOUNTER — APPOINTMENT (OUTPATIENT)
Dept: HEART AND VASCULAR | Facility: CLINIC | Age: 74
End: 2024-05-16
Payer: MEDICARE

## 2024-05-16 VITALS
WEIGHT: 106 LBS | OXYGEN SATURATION: 98 % | HEART RATE: 60 BPM | SYSTOLIC BLOOD PRESSURE: 134 MMHG | TEMPERATURE: 98 F | BODY MASS INDEX: 19.51 KG/M2 | HEIGHT: 62 IN | DIASTOLIC BLOOD PRESSURE: 80 MMHG

## 2024-05-16 PROCEDURE — 99203 OFFICE O/P NEW LOW 30 MIN: CPT

## 2024-05-16 PROCEDURE — 93000 ELECTROCARDIOGRAM COMPLETE: CPT

## 2024-05-16 RX ORDER — VALSARTAN 40 MG/1
40 TABLET, COATED ORAL DAILY
Qty: 90 | Refills: 3 | Status: ACTIVE | COMMUNITY
Start: 2024-05-16 | End: 1900-01-01

## 2024-05-16 RX ORDER — CLOTRIMAZOLE 10 MG/G
1 CREAM TOPICAL 3 TIMES DAILY
Qty: 1 | Refills: 1 | Status: DISCONTINUED | COMMUNITY
Start: 2022-06-23 | End: 2024-05-16

## 2024-05-16 RX ORDER — ALENDRONATE SODIUM 70 MG/1
70 TABLET ORAL
Qty: 4 | Refills: 5 | Status: DISCONTINUED | COMMUNITY
Start: 2021-10-28 | End: 2024-05-16

## 2024-05-16 RX ORDER — AMLODIPINE BESYLATE 10 MG/1
10 TABLET ORAL
Qty: 90 | Refills: 0 | Status: DISCONTINUED | COMMUNITY
Start: 2021-03-09 | End: 2024-05-16

## 2024-05-16 RX ORDER — ATORVASTATIN CALCIUM 10 MG/1
10 TABLET, FILM COATED ORAL DAILY
Refills: 0 | Status: DISCONTINUED | COMMUNITY
End: 2024-05-16

## 2024-05-16 RX ORDER — ATORVASTATIN CALCIUM 20 MG/1
20 TABLET, FILM COATED ORAL
Qty: 90 | Refills: 2 | Status: ACTIVE | COMMUNITY
Start: 2022-06-23 | End: 1900-01-01

## 2024-05-16 RX ORDER — BETAMETHASONE DIPROPIONATE 0.5 MG/G
0.05 CREAM, AUGMENTED TOPICAL
Qty: 2 | Refills: 2 | Status: DISCONTINUED | COMMUNITY
Start: 2022-08-11 | End: 2024-05-16

## 2024-05-16 RX ORDER — ALBUTEROL SULFATE 90 UG/1
108 (90 BASE) INHALANT RESPIRATORY (INHALATION) 4 TIMES DAILY
Qty: 1 | Refills: 0 | Status: DISCONTINUED | COMMUNITY
Start: 2019-01-25 | End: 2024-05-16

## 2024-05-16 RX ORDER — AMLODIPINE AND VALSARTAN 10; 160 MG/1; MG/1
10-160 TABLET, FILM COATED ORAL DAILY
Refills: 0 | Status: DISCONTINUED | COMMUNITY
End: 2024-05-16

## 2024-05-16 NOTE — ASSESSMENT
[FreeTextEntry1] : - need med recc does not know her meds - will check HM labs - echo and stress echo  - will resume her statin and bp med pending her labs and med recc - fu in one month

## 2024-05-16 NOTE — HISTORY OF PRESENT ILLNESS
[FreeTextEntry1] : 73 F HTN HPL RA (Tx with  Humira stopped due to rash) OA Granuloma Anxiety Former Smoker    here for sob. She  cant get a good breath in. She feels sob when she rushes she cant catch her breath. she has had a lot of mucous in her lungs. she feels good at the gym. she gets sob when she is rushing to do stuff. the pressure seems to affect her breathing. no exertional chest pain.     ecg nsr 5/16/24

## 2024-05-17 LAB
ALBUMIN SERPL ELPH-MCNC: 4.7 G/DL
ALP BLD-CCNC: 81 U/L
ALT SERPL-CCNC: 36 U/L
ANION GAP SERPL CALC-SCNC: 14 MMOL/L
AST SERPL-CCNC: 39 U/L
BILIRUB SERPL-MCNC: 0.6 MG/DL
BUN SERPL-MCNC: 19 MG/DL
CALCIUM SERPL-MCNC: 10.3 MG/DL
CHLORIDE SERPL-SCNC: 104 MMOL/L
CHOLEST SERPL-MCNC: 214 MG/DL
CO2 SERPL-SCNC: 21 MMOL/L
CREAT SERPL-MCNC: 0.67 MG/DL
CREAT SPEC-SCNC: 70 MG/DL
EGFR: 93 ML/MIN/1.73M2
ESTIMATED AVERAGE GLUCOSE: 108 MG/DL
GLUCOSE SERPL-MCNC: 96 MG/DL
HBA1C MFR BLD HPLC: 5.4 %
HCT VFR BLD CALC: 46.6 %
HDLC SERPL-MCNC: 97 MG/DL
HGB BLD-MCNC: 15.5 G/DL
LDLC SERPL CALC-MCNC: 95 MG/DL
MCHC RBC-ENTMCNC: 31.9 PG
MCHC RBC-ENTMCNC: 33.3 GM/DL
MCV RBC AUTO: 95.9 FL
MICROALBUMIN 24H UR DL<=1MG/L-MCNC: <1.2 MG/DL
MICROALBUMIN/CREAT 24H UR-RTO: NORMAL MG/G
NONHDLC SERPL-MCNC: 116 MG/DL
NT-PROBNP SERPL-MCNC: 235 PG/ML
PLATELET # BLD AUTO: 287 K/UL
POTASSIUM SERPL-SCNC: 5 MMOL/L
PROT SERPL-MCNC: 6.8 G/DL
RBC # BLD: 4.86 M/UL
RBC # FLD: 13.3 %
SODIUM SERPL-SCNC: 139 MMOL/L
TRIGL SERPL-MCNC: 129 MG/DL
WBC # FLD AUTO: 6.09 K/UL

## 2024-05-20 ENCOUNTER — APPOINTMENT (OUTPATIENT)
Dept: INTERNAL MEDICINE | Facility: CLINIC | Age: 74
End: 2024-05-20

## 2024-05-22 ENCOUNTER — APPOINTMENT (OUTPATIENT)
Dept: OBGYN | Facility: CLINIC | Age: 74
End: 2024-05-22

## 2024-05-23 ENCOUNTER — APPOINTMENT (OUTPATIENT)
Dept: HEART AND VASCULAR | Facility: CLINIC | Age: 74
End: 2024-05-23
Payer: MEDICARE

## 2024-05-23 DIAGNOSIS — R06.09 OTHER FORMS OF DYSPNEA: ICD-10-CM

## 2024-05-23 PROCEDURE — 93351 STRESS TTE COMPLETE: CPT

## 2024-05-23 PROCEDURE — 93306 TTE W/DOPPLER COMPLETE: CPT | Mod: 59

## 2024-05-24 ENCOUNTER — LABORATORY RESULT (OUTPATIENT)
Age: 74
End: 2024-05-24

## 2024-05-24 ENCOUNTER — NON-APPOINTMENT (OUTPATIENT)
Age: 74
End: 2024-05-24

## 2024-05-24 ENCOUNTER — RESULT REVIEW (OUTPATIENT)
Age: 74
End: 2024-05-24

## 2024-05-24 ENCOUNTER — APPOINTMENT (OUTPATIENT)
Dept: RHEUMATOLOGY | Facility: CLINIC | Age: 74
End: 2024-05-24
Payer: MEDICARE

## 2024-05-24 VITALS
HEIGHT: 62 IN | DIASTOLIC BLOOD PRESSURE: 64 MMHG | WEIGHT: 107 LBS | BODY MASS INDEX: 19.69 KG/M2 | SYSTOLIC BLOOD PRESSURE: 153 MMHG | HEART RATE: 70 BPM | OXYGEN SATURATION: 99 %

## 2024-05-24 DIAGNOSIS — L82.1 OTHER SEBORRHEIC KERATOSIS: ICD-10-CM

## 2024-05-24 DIAGNOSIS — R10.2 PELVIC AND PERINEAL PAIN: ICD-10-CM

## 2024-05-24 DIAGNOSIS — L30.9 DERMATITIS, UNSPECIFIED: ICD-10-CM

## 2024-05-24 DIAGNOSIS — J31.0 CHRONIC RHINITIS: ICD-10-CM

## 2024-05-24 DIAGNOSIS — M25.50 PAIN IN UNSPECIFIED JOINT: ICD-10-CM

## 2024-05-24 DIAGNOSIS — M81.0 AGE-RELATED OSTEOPOROSIS W/OUT CURRENT PATHOLOGICAL FRACTURE: ICD-10-CM

## 2024-05-24 PROCEDURE — 99205 OFFICE O/P NEW HI 60 MIN: CPT

## 2024-05-24 NOTE — ASSESSMENT
[FreeTextEntry1] : 73 y old F with PMH of skin Rash Granuloma Annulare tx topical steroid and Humira in the past with partial improvement, also history of OP took Biphosphonate and stopped due to side affects > 5 years ago , since 2023 had pneumonia twice improved with antibiotics , states last CT scan was abnormal , joint pain with cold weather and progressed BL hand deformities states over few month  -due to joint pain and stiffness with cold weather skin rash will evaluate for inflammatory arthritis  -CCP and RF, ESR and CRP  -HLA B27 skin rash and history of pelvic pain check also pelvic X ray evaluate for inflammatory arthritis  -bl hand and ankle/foot x ray and Pelvic x ray  -taking vit D, but not interested in OP treatment -will fax request for Pulmonary to send us CT scan imaging

## 2024-05-24 NOTE — PLAN
[FreeTextEntry1] : #dyspnea on exertion   2021- Pulm- thought to be functional; TTE normal PASP, PFTs showed normal lung volumes and spirometry, diffusion capacity mod reduced, CT non con- 2 mm solid nodule seen in RLL and LLL , no ILD or evidence of airtrapping #Osteoporosis DEXA 1/2023: L hip: BMD 0.6, T score -2.8, z score -1.3; fem neck BMD 0.494, tscore -3.2, zscore -1.4; spine BMD 0.723, tscore -2.9, zscore -0.8 5/2024 calcium: 10.3 DEXA shows significant decrease in BMD from prior DEXA (L hip: 14.9%; femoral neck 13.9%; spine 6.9%) Patient states she has not taken alendronate. Patient without hx of osteonecrosis, dysphagia, esophagitis, or ulcers- not a candidate for zoledronic acid Given progression of osteoporosis, patient urged to take alendronate, ca, and vitD supplementation Patient counseled on foods w high Ca and vitD such as milk, yogurt, cottage cheese, salmon Patient counseled on weight bearing exercises 30 min a day Calcium: 1200mg daily vitamin D: 800 international units  Plan: - patient to take alendronate daily for at least 5 years- script refilled - patient to take supplemental calcium (1200mg daily) and vitamin D (800 IU)- script refilled - DEXA scan to be complete in 2025 - labs: CMP, vit D  #HLD Home meds: lipitor 20mg  Patient compliant with no adverse effects  5/2024 lipid panel: , , HDL 97 LDL 95 5/2024 hgb a1c 5.4% Plan - c/w lipitor 20 mg  - refilled prescription   #HTN Home meds: amlodipine 10mg  Patient compliant with medications with no adverse effects BP today:  Plan - c/w amlodpine 10mg  - refilled prescription   #granuloma annule 2021: derm biopsy  2023 tx w Humira for 6 mos w improvement   #HCM vaccinations covid influenza rsv tdap zoster:  pneumococcal:  smoking hx: AAA testing- CT chest non con 2021- no aortic dilation  DEXA: 2023- OP recheck 2025  mammo: 2022: BIRADS2 - will sent script colonoscopy: 9/2023- no abnormalities seen  hrHPV 5/2023: alpha detected pap smear: hx of LGSIL s/p colposcopy w benign results- last Pap 11/2023- benign  Most recent benign- 6/2023- ASCUS 5/2022: LSIL

## 2024-05-24 NOTE — HISTORY OF PRESENT ILLNESS
[FreeTextEntry1] :  HPI: 73 y old F with PMH Grauloma Annulare tx topical steorid cream also Humira for 6 month partial improvement of rash , OP took Bisphosphonates years ago and stopped  5/2021 diffuse body rash Evaluated by Dermatology biopsy Biopsy showed Granuloma Annule Humira Given 2023 took for 6 month partially improved,  With Cold weather whole body pain  no joint swelling, no morning stiffness , no skin tightness, no dysphagia, no abd pain, no diarrhea, no blood in urine stool  no ryanoid   no uveitis, no dry eyes or mouth, no hair loss, no chest pain no cough, since 2023 had pneumonia twice improved with 5 days of antibiotics  Has seen by pulmonary  Osteoporosis    FH: father has skin psoriasis   SH : former smoking > 10 year , no toxic habits   DEXA 2023   Since 5/27/2016, there is again osteoporosis.  , stopped bisphosphonate > 5 took only for few month

## 2024-05-24 NOTE — PHYSICAL EXAM
[General Appearance - Alert] : alert [General Appearance - Well Nourished] : well nourished [Sclera] : the sclera and conjunctiva were normal [PERRL With Normal Accommodation] : pupils were equal in size, round, and reactive to light [Extraocular Movements] : extraocular movements were intact [Examination Of The Oral Cavity] : the lips and gums were normal [Nasal Cavity] : the nasal mucosa and septum were normal [Oropharynx] : the oropharynx was normal [] : the neck was supple [Respiration, Rhythm And Depth] : normal respiratory rhythm and effort [Exaggerated Use Of Accessory Muscles For Inspiration] : no accessory muscle use [Auscultation Breath Sounds / Voice Sounds] : lungs were clear to auscultation bilaterally [Heart Sounds] : normal S1 and S2 [Bowel Sounds] : normal bowel sounds [Abdomen Soft] : soft [Abdomen Tenderness] : non-tender [Cervical Lymph Nodes Enlarged Posterior Bilaterally] : posterior cervical [Cervical Lymph Nodes Enlarged Anterior Bilaterally] : anterior cervical [No Spinal Tenderness] : no spinal tenderness [Nail Clubbing] : no clubbing  or cyanosis of the fingernails [Motor Tone] : muscle strength and tone were normal [Musculoskeletal - Swelling] : no joint swelling seen [Motor Exam] : the motor exam was normal [Affect] : the affect was normal [FreeTextEntry1] : anxious about rash and diagnosis

## 2024-05-24 NOTE — HISTORY OF PRESENT ILLNESS
[FreeTextEntry1] : CPE [de-identified] : Patient is a 72 yo F w PMHx former smoker, HTN, HLD, osteoporosis, RA, and granuloma annulare who presents for CPE Recently wally Dr. Hull (cards) iso dyspnea on exertion- feels like she cant get a breath in0 echo and stress test

## 2024-05-28 ENCOUNTER — APPOINTMENT (OUTPATIENT)
Dept: INTERNAL MEDICINE | Facility: CLINIC | Age: 74
End: 2024-05-28
Payer: MEDICARE

## 2024-05-28 VITALS
OXYGEN SATURATION: 99 % | BODY MASS INDEX: 19.88 KG/M2 | HEART RATE: 72 BPM | WEIGHT: 108 LBS | DIASTOLIC BLOOD PRESSURE: 78 MMHG | HEIGHT: 62 IN | SYSTOLIC BLOOD PRESSURE: 152 MMHG | TEMPERATURE: 98 F

## 2024-05-28 DIAGNOSIS — I10 ESSENTIAL (PRIMARY) HYPERTENSION: ICD-10-CM

## 2024-05-28 DIAGNOSIS — Z00.01 ENCOUNTER FOR GENERAL ADULT MEDICAL EXAMINATION WITH ABNORMAL FINDINGS: ICD-10-CM

## 2024-05-28 LAB
CCP AB SER IA-ACNC: <8 UNITS
CRP SERPL-MCNC: <3 MG/L
ERYTHROCYTE [SEDIMENTATION RATE] IN BLOOD BY WESTERGREN METHOD: 6 MM/HR
RF+CCP IGG SER-IMP: NEGATIVE
RHEUMATOID FACT SER QL: <10 IU/ML

## 2024-05-28 PROCEDURE — 99213 OFFICE O/P EST LOW 20 MIN: CPT | Mod: 25

## 2024-05-28 PROCEDURE — 99397 PER PM REEVAL EST PAT 65+ YR: CPT | Mod: 25,GC

## 2024-05-28 NOTE — PLAN
[FreeTextEntry1] : #HTN Remains hypertensive SBP 150s/60s. Admits to taking valsartan 20mg even though she is supposed to take 40mg. Does not take blood pressure at home but does have a BP machine - recommend valsartan 40mg daily - dietary modification, reduce salt consumption (eats a lot of peanuts) - recommend BP diary  #Granuloma annulare Was on humira and topical steroid in past. Saw Dr. Chávez in past with biopsy proven GA.  - referral sent to follow up with Dr. Chávez  #Finger nodules Patient is following Dr. Gordillo who has completed numerous blood studies and requesting imaging of patients for ongoing work up - recommend continue f/u with rheum Dr. Gordillo  #HLD - continue atorvastastin 20mg daily  #osteoporosis Was on bisphosphonates in past with adverse reaction of rash - vitamin d and calcium - exercise and physical therapy  #HCM Had colonoscopy 9/2023 with Dr. Randhawa, recommended to repeat in 10 years Had mammogram completed earlier this year at outside institution and advised to repeat in 3-6 months, pt recommended to bring records at next visit - Had low dose CT chest for lung cancer screening this year and recommended to repeat in 1 year, has results for year 2023, will bring results of 2024 next visit. Previously was seeing Dr. Erich Ramon however he no longer takes her insurance - Gyn referral provided, history of LSIL in past and last PAP negative with Dr. Cordero - f/u in 2 weeks for blood pressure monitoring

## 2024-05-28 NOTE — END OF VISIT
[] : Resident [FreeTextEntry3] :  73 year old F presenting for CPE. Well appearing on exam. Referred for derm and GYN follow up. Advised her to take 40mg enalapril consistently given elevated BP in office today. RTC 2-3 weeks for BP check, earlier prn.

## 2024-05-28 NOTE — HISTORY OF PRESENT ILLNESS
[FreeTextEntry1] : Pt is here for a CPE visit.  [de-identified] : 73 year old female with PMHx HTN, HLD, osteoporosis, granuloma annulare, anxiety presents for follow up. Recently saw rheum Dr. Gordillo given history of finger nodule and history of granuloma annulare. Was on bisphosphonates but stopped in past due to rash. Was on humira for granuloma annulare. Pt feels well but wants to establish care with new gyn as Dr. Cordero no longer taking her insurance. Admits to taking valsartan 20mg even though she is supposed to take 40mg. Denies headache, dizziness, chest pain, SOB, abd pain. Reports still having rashes throughout body.

## 2024-05-28 NOTE — PHYSICAL EXAM
[Normal] : soft, non-tender, non-distended, no masses palpated, no HSM and normal bowel sounds [de-identified] : numerous annulare lesion bilateral legs, thighs, arms. Firm nodules on multiple DIP joints

## 2024-05-28 NOTE — ASSESSMENT
[FreeTextEntry1] : 73 year old female with PMHx HTN, HLD, osteoporosis, granuloma annulare, anxiety presents for follow up

## 2024-05-29 ENCOUNTER — OUTPATIENT (OUTPATIENT)
Dept: OUTPATIENT SERVICES | Facility: HOSPITAL | Age: 74
LOS: 1 days | End: 2024-05-29
Payer: MEDICARE

## 2024-05-29 LAB
ALBUMIN MFR SERPL ELPH: 66.6 %
ALBUMIN SERPL-MCNC: 4.5 G/DL
ALBUMIN/GLOB SERPL: 2 RATIO
ALPHA1 GLOB MFR SERPL ELPH: 3.7 %
ALPHA1 GLOB SERPL ELPH-MCNC: 0.3 G/DL
ALPHA2 GLOB MFR SERPL ELPH: 8.4 %
ALPHA2 GLOB SERPL ELPH-MCNC: 0.6 G/DL
B-GLOBULIN MFR SERPL ELPH: 9.6 %
B-GLOBULIN SERPL ELPH-MCNC: 0.7 G/DL
GAMMA GLOB FLD ELPH-MCNC: 0.8 G/DL
GAMMA GLOB MFR SERPL ELPH: 11.7 %
INTERPRETATION SERPL IEP-IMP: NORMAL
M PROTEIN SPEC IFE-MCNC: NORMAL
PROT SERPL-MCNC: 6.8 G/DL
PROT SERPL-MCNC: 6.8 G/DL

## 2024-05-29 PROCEDURE — 73660 X-RAY EXAM OF TOE(S): CPT

## 2024-05-29 PROCEDURE — 73600 X-RAY EXAM OF ANKLE: CPT | Mod: 26,50

## 2024-05-29 PROCEDURE — 73660 X-RAY EXAM OF TOE(S): CPT | Mod: 26,50

## 2024-05-29 PROCEDURE — 72170 X-RAY EXAM OF PELVIS: CPT

## 2024-05-29 PROCEDURE — 73130 X-RAY EXAM OF HAND: CPT

## 2024-05-29 PROCEDURE — 73130 X-RAY EXAM OF HAND: CPT | Mod: 26,50

## 2024-05-29 PROCEDURE — 73600 X-RAY EXAM OF ANKLE: CPT

## 2024-05-29 PROCEDURE — 72170 X-RAY EXAM OF PELVIS: CPT | Mod: 26

## 2024-05-30 LAB — HLA-B27 QL NAA+PROBE: NORMAL

## 2024-06-11 ENCOUNTER — LABORATORY RESULT (OUTPATIENT)
Age: 74
End: 2024-06-11

## 2024-06-11 ENCOUNTER — APPOINTMENT (OUTPATIENT)
Dept: OBGYN | Facility: CLINIC | Age: 74
End: 2024-06-11
Payer: MEDICARE

## 2024-06-11 VITALS — DIASTOLIC BLOOD PRESSURE: 60 MMHG | SYSTOLIC BLOOD PRESSURE: 120 MMHG | WEIGHT: 109 LBS | BODY MASS INDEX: 19.94 KG/M2

## 2024-06-11 DIAGNOSIS — R87.619 UNSPECIFIED ABNORMAL CYTOLOGICAL FINDINGS IN SPECIMENS FROM CERVIX UTERI: ICD-10-CM

## 2024-06-11 DIAGNOSIS — N93.9 ABNORMAL UTERINE AND VAGINAL BLEEDING, UNSPECIFIED: ICD-10-CM

## 2024-06-11 PROCEDURE — G0101: CPT

## 2024-06-12 ENCOUNTER — OUTPATIENT (OUTPATIENT)
Dept: OUTPATIENT SERVICES | Facility: HOSPITAL | Age: 74
LOS: 1 days | End: 2024-06-12

## 2024-06-12 ENCOUNTER — RESULT REVIEW (OUTPATIENT)
Age: 74
End: 2024-06-12

## 2024-06-12 ENCOUNTER — NON-APPOINTMENT (OUTPATIENT)
Age: 74
End: 2024-06-12

## 2024-06-12 LAB — HPV HIGH+LOW RISK DNA PNL CVX: DETECTED

## 2024-06-12 PROCEDURE — 76830 TRANSVAGINAL US NON-OB: CPT | Mod: 26

## 2024-06-12 PROCEDURE — 76856 US EXAM PELVIC COMPLETE: CPT | Mod: 26

## 2024-06-12 PROCEDURE — 76856 US EXAM PELVIC COMPLETE: CPT

## 2024-06-12 PROCEDURE — 76830 TRANSVAGINAL US NON-OB: CPT

## 2024-06-13 ENCOUNTER — NON-APPOINTMENT (OUTPATIENT)
Age: 74
End: 2024-06-13

## 2024-06-14 ENCOUNTER — APPOINTMENT (OUTPATIENT)
Dept: DERMATOLOGY | Facility: CLINIC | Age: 74
End: 2024-06-14
Payer: MEDICARE

## 2024-06-14 DIAGNOSIS — L81.0 POSTINFLAMMATORY HYPERPIGMENTATION: ICD-10-CM

## 2024-06-14 DIAGNOSIS — L92.0 GRANULOMA ANNULARE: ICD-10-CM

## 2024-06-14 PROCEDURE — 99214 OFFICE O/P EST MOD 30 MIN: CPT

## 2024-06-14 RX ORDER — TRIAMCINOLONE ACETONIDE 1 MG/G
0.1 CREAM TOPICAL
Qty: 1 | Refills: 1 | Status: ACTIVE | COMMUNITY
Start: 2024-06-14 | End: 1900-01-01

## 2024-06-14 NOTE — PHYSICAL EXAM
[Alert] : alert [Oriented x 3] : ~L oriented x 3 [Declined] : declined [FreeTextEntry3] : Focused exam: -pink brown papules and plaques on the bl upper extremities, bl lower extremities, abdomen, back

## 2024-06-14 NOTE — HISTORY OF PRESENT ILLNESS
[FreeTextEntry1] : GA [de-identified] : 72yo F presents for follow up, last seen 9/7/22 by Dr. Chávez generalized GA rash still active although overall better from when she was first diagnosed several years ago used to see Dr. Ma - has been on nbUVB x several mos w/ improvement, also was on Humira x few mos with improvement but stopped d/t concerns about side effects pt reports utd on routine screening including mammo and colonoscopy has not used any topicals denies pruritus recently saw rheum Dr. Rosenberg for joint stiffness  meds: atorvastatin, valsartan

## 2024-06-14 NOTE — ASSESSMENT
[FreeTextEntry1] : #Generalized granuloma annulare #PIH -chronic, flaring -I have discussed the chronic nature and course of this condition -discussed tx options including topicals, nbUVB, systemic meds including hydroxychloroquine, dapsone, isotretinoin, TNF alpha inhibitors. Reviewed risks, potential SE of each. Pt opts for nbUVB phototherapy, will request insurance auth. -Start triamcinolone 0.1% cream BID to affected areas on the body until rash resolved. Do not use on face, axillae, groin. -Proper medication use and side effects discussed, including cutaneous atrophy, telangiectasias, and striae. Avoid long-term use. -follow up with PCP, specialists for medical comorbidities and routine age appropriate screening  RTC 6 weeks

## 2024-06-24 ENCOUNTER — APPOINTMENT (OUTPATIENT)
Dept: INTERNAL MEDICINE | Facility: CLINIC | Age: 74
End: 2024-06-24

## 2024-06-25 LAB — CYTOLOGY CVX/VAG DOC THIN PREP: ABNORMAL

## 2024-06-25 NOTE — DISCUSSION/SUMMARY
[FreeTextEntry1] : Plan for pelvic U/S to evaluate endometrial lining as pt requesting further intervention with vaginal dryness with intercourse Pt counseled on R/B/A to intravaginal estrogen therapy and need to evaluate lining as she is having bleeding with intercourse and abd fullness. If U/S wnl pt to be prescribed vaginal estrogen therapy Call pt with results PAP smear done for h/o HPV+ ASCUS 1.5 yrs ago. PT to have q6m pap, now need to f/u in 6 months for repeat pap and will then end 2 yr surveillance.  d/w Dr. pipe BARTON PGY3

## 2024-06-25 NOTE — HISTORY OF PRESENT ILLNESS
Neurology [Patient reported mammogram was normal] : Patient reported mammogram was normal [Patient reported PAP Smear was normal] : Patient reported PAP Smear was normal [postmenopausal] : postmenopausal [Y] : Patient is sexually active [Post-Menopause, No Sxs] : post-menopausal, currently without symptoms [Currently Active] : currently active [FreeTextEntry1] : 73 G0 LMP ~51yo, here for repeat pap for h/o of high risk HPV+ andASCUS, colpo wnl. PT also c/o bleeding with intercourse, using lubrication but has pain with penetration. No bleeding between sex just during intercourse. Denies discharge and smell. Denies leakage of urine. C/o heaviness in her abd but denies n/v/d, denies changews in weight, night sweats.   OB: G0 GYN: currently sexually active, see above  PMH: psoriasis, HTN, HLD MED:Valsartan, and atorovastatic PSH: L wrist surgery 2017 ALL: penicillin- rash   [Mammogramdate] : 2024 [BoneDensityDate] : 1/17/2023 [PapSmeardate] : 11/20/2023 [TextBox_37] : osteoporosis

## 2024-06-25 NOTE — PHYSICAL EXAM
[Chaperone Present] : A chaperone was present in the examining room during all aspects of the physical examination [[id number]] : A chaperone was present during the pelvic exam. [Appropriately responsive] : appropriately responsive [Alert] : alert [Soft] : soft [Non-tender] : non-tender [Non-distended] : non-distended [No HSM] : No HSM [No Lesions] : no lesions [No Mass] : no mass [Oriented x3] : oriented x3 [Labia Majora] : normal [Atrophy] : atrophy [Normal] : normal [FreeTextEntry2] : Jaymie Kumar [FreeTextEntry5] : normal appearing cervic flush with posterior vaginal wall [FreeTextEntry8] : normal bimanual exam

## 2024-06-27 ENCOUNTER — NON-APPOINTMENT (OUTPATIENT)
Age: 74
End: 2024-06-27

## 2024-06-28 ENCOUNTER — APPOINTMENT (OUTPATIENT)
Dept: RHEUMATOLOGY | Facility: CLINIC | Age: 74
End: 2024-06-28
Payer: MEDICARE

## 2024-06-28 VITALS
DIASTOLIC BLOOD PRESSURE: 82 MMHG | HEART RATE: 58 BPM | OXYGEN SATURATION: 100 % | BODY MASS INDEX: 19.51 KG/M2 | WEIGHT: 106 LBS | HEIGHT: 62 IN | TEMPERATURE: 97.2 F | SYSTOLIC BLOOD PRESSURE: 148 MMHG

## 2024-06-28 DIAGNOSIS — M15.9 POLYOSTEOARTHRITIS, UNSPECIFIED: ICD-10-CM

## 2024-06-28 PROCEDURE — G2211 COMPLEX E/M VISIT ADD ON: CPT

## 2024-06-28 PROCEDURE — 99215 OFFICE O/P EST HI 40 MIN: CPT

## 2024-07-02 ENCOUNTER — LABORATORY RESULT (OUTPATIENT)
Age: 74
End: 2024-07-02

## 2024-07-02 ENCOUNTER — APPOINTMENT (OUTPATIENT)
Dept: OBGYN | Facility: CLINIC | Age: 74
End: 2024-07-02

## 2024-07-02 VITALS — SYSTOLIC BLOOD PRESSURE: 110 MMHG | DIASTOLIC BLOOD PRESSURE: 70 MMHG

## 2024-07-02 DIAGNOSIS — R30.0 DYSURIA: ICD-10-CM

## 2024-07-02 DIAGNOSIS — R87.619 UNSPECIFIED ABNORMAL CYTOLOGICAL FINDINGS IN SPECIMENS FROM CERVIX UTERI: ICD-10-CM

## 2024-07-02 PROCEDURE — 81002 URINALYSIS NONAUTO W/O SCOPE: CPT

## 2024-07-02 PROCEDURE — 99213 OFFICE O/P EST LOW 20 MIN: CPT

## 2024-07-05 ENCOUNTER — APPOINTMENT (OUTPATIENT)
Dept: RHEUMATOLOGY | Facility: CLINIC | Age: 74
End: 2024-07-05

## 2024-07-08 ENCOUNTER — APPOINTMENT (OUTPATIENT)
Dept: INTERNAL MEDICINE | Facility: CLINIC | Age: 74
End: 2024-07-08
Payer: MEDICARE

## 2024-07-08 VITALS
SYSTOLIC BLOOD PRESSURE: 116 MMHG | HEIGHT: 62 IN | WEIGHT: 106 LBS | DIASTOLIC BLOOD PRESSURE: 69 MMHG | TEMPERATURE: 97.3 F | OXYGEN SATURATION: 98 % | HEART RATE: 70 BPM | BODY MASS INDEX: 19.51 KG/M2

## 2024-07-08 DIAGNOSIS — N30.00 ACUTE CYSTITIS W/OUT HEMATURIA: ICD-10-CM

## 2024-07-08 DIAGNOSIS — F41.9 ANXIETY DISORDER, UNSPECIFIED: ICD-10-CM

## 2024-07-08 DIAGNOSIS — N39.0 URINARY TRACT INFECTION, SITE NOT SPECIFIED: ICD-10-CM

## 2024-07-08 DIAGNOSIS — R06.00 DYSPNEA, UNSPECIFIED: ICD-10-CM

## 2024-07-08 DIAGNOSIS — M81.0 AGE-RELATED OSTEOPOROSIS W/OUT CURRENT PATHOLOGICAL FRACTURE: ICD-10-CM

## 2024-07-08 LAB
APPEARANCE: CLEAR
BACTERIA UR CULT: ABNORMAL
BILIRUBIN URINE: NEGATIVE
BLOOD URINE: NEGATIVE
COLOR: YELLOW
GLUCOSE QUALITATIVE U: NEGATIVE MG/DL
KETONES URINE: NEGATIVE MG/DL
LEUKOCYTE ESTERASE URINE: NEGATIVE
NITRITE URINE: POSITIVE
PH URINE: 8
PROTEIN URINE: NEGATIVE MG/DL
SPECIFIC GRAVITY URINE: 1.02
UROBILINOGEN URINE: 0.2 MG/DL

## 2024-07-08 PROCEDURE — 99214 OFFICE O/P EST MOD 30 MIN: CPT

## 2024-07-08 PROCEDURE — G2211 COMPLEX E/M VISIT ADD ON: CPT

## 2024-07-08 RX ORDER — LEVOFLOXACIN 750 MG/1
750 TABLET, FILM COATED ORAL DAILY
Qty: 7 | Refills: 0 | Status: ACTIVE | COMMUNITY
Start: 2024-07-08 | End: 1900-01-01

## 2024-07-16 ENCOUNTER — NON-APPOINTMENT (OUTPATIENT)
Age: 74
End: 2024-07-16

## 2024-07-16 ENCOUNTER — APPOINTMENT (OUTPATIENT)
Dept: HEART AND VASCULAR | Facility: CLINIC | Age: 74
End: 2024-07-16
Payer: MEDICARE

## 2024-07-16 VITALS
SYSTOLIC BLOOD PRESSURE: 134 MMHG | BODY MASS INDEX: 19.51 KG/M2 | DIASTOLIC BLOOD PRESSURE: 70 MMHG | HEIGHT: 62 IN | TEMPERATURE: 98.4 F | OXYGEN SATURATION: 98 % | WEIGHT: 106 LBS | HEART RATE: 51 BPM

## 2024-07-16 DIAGNOSIS — R06.09 OTHER FORMS OF DYSPNEA: ICD-10-CM

## 2024-07-16 PROCEDURE — 99214 OFFICE O/P EST MOD 30 MIN: CPT

## 2024-07-16 PROCEDURE — G2211 COMPLEX E/M VISIT ADD ON: CPT

## 2024-07-16 PROCEDURE — 93000 ELECTROCARDIOGRAM COMPLETE: CPT

## 2024-07-19 ENCOUNTER — APPOINTMENT (OUTPATIENT)
Dept: DERMATOLOGY | Facility: CLINIC | Age: 74
End: 2024-07-19

## 2024-07-29 ENCOUNTER — NON-APPOINTMENT (OUTPATIENT)
Age: 74
End: 2024-07-29

## 2024-07-30 ENCOUNTER — NON-APPOINTMENT (OUTPATIENT)
Age: 74
End: 2024-07-30

## 2024-07-30 ENCOUNTER — APPOINTMENT (OUTPATIENT)
Dept: GYNECOLOGIC ONCOLOGY | Facility: CLINIC | Age: 74
End: 2024-07-30
Payer: MEDICARE

## 2024-07-30 ENCOUNTER — APPOINTMENT (OUTPATIENT)
Dept: DERMATOLOGY | Facility: CLINIC | Age: 74
End: 2024-07-30
Payer: MEDICARE

## 2024-07-30 VITALS
WEIGHT: 106 LBS | HEART RATE: 61 BPM | DIASTOLIC BLOOD PRESSURE: 62 MMHG | BODY MASS INDEX: 19.51 KG/M2 | SYSTOLIC BLOOD PRESSURE: 158 MMHG | OXYGEN SATURATION: 99 % | TEMPERATURE: 98.2 F | HEIGHT: 62 IN

## 2024-07-30 DIAGNOSIS — R87.612 LOW GRADE SQUAMOUS INTRAEPITHELIAL LESION ON CYTOLOGIC SMEAR OF CERVIX (LGSIL): ICD-10-CM

## 2024-07-30 PROCEDURE — 57454 BX/CURETT OF CERVIX W/SCOPE: CPT

## 2024-07-30 PROCEDURE — 99203 OFFICE O/P NEW LOW 30 MIN: CPT | Mod: 25

## 2024-07-30 PROCEDURE — 96910 PHOTCHMTX TAR&UVB/PTRLTM&UVB: CPT

## 2024-07-30 RX ORDER — ESTRADIOL 0.1 MG/G
0.1 CREAM VAGINAL
Qty: 1 | Refills: 6 | Status: ACTIVE | COMMUNITY
Start: 2024-07-30 | End: 1900-01-01

## 2024-07-30 NOTE — PROCEDURE
[Colposcopy] : colposcopy [LGSIL] : low grade squamous intraepithelial lesion [HPV high risk] : PCR positive for high risk HPV [Patient] : the patient [Verbal Consent] : verbal consent was obtained prior to the procedure and is detailed in the patient's record [No Abnormalities] : no abnormalities [Acetowhite ___ o'clock] : ascetowhite changes at the [unfilled] ~Uo'clock position [Atypical Vessels ___ o'clock] : no atypical vessels [Punctation ___ o'clock] : no punctation [Mosaicism ___ o'clock] : no mosaicism [Biopsies Taken: # ___] : [unfilled] biopsies taken of the cervix [ECC Done] : an Endocervical curettage was performed.  [Biopsy Locations ___ o'clock] : the biopsies were taken at the [unfilled] o'clock position [Jacinda's] : Jacinda's solution [No Complications] : none [Tolerated Well] : the patient tolerated the procedure well

## 2024-07-30 NOTE — DISCUSSION/SUMMARY
[FreeTextEntry1] : With the aid of diagrams we reviewed the findings in detail.  We reviewed HPV its pathogenesis and the implications of an abnormal cervical cytology and the pathogenesis of dysplasia in detail.  The risk of invasive cervical cancer in women with a finding of low-grade squamous intraepithelial lesions (LSIL) is very low. Risk of SHILPI 2 is approximately 20% and SHILPI-3 is approximately 5-10%.  ASCCP guidelines were reviewed with the patient. In this age group with LGSIL and high-risk HPV colposcopy should be performed.  If the result is consistent with SHILPI-2 or 3, or there is an unsatisfactory colposcopy, conization of the cervix as indicated. If, SHILPI -1 is found, observation is advised rather than treatment. If SHILPI 1 persists for two or more years, conization is also recommended.  This has been discussed with the patient in detail. She indicates her understanding.  We will contact her with the results of the biopsies once they're available.   Will review pap history with Dr. Carvajal when biopsies are ready. Patient has had LSIL paps for 2 years, however 5/2023 had downgrade to ASCUS pap. When pap repeated ?1 month later was LSIL.  Patient with difficulty tolerating colposcopy today, will need LEEP under anesthesia if recommended Patient with history of post coital staining, 1mm endometrial stripe on TVUS. Trial of vaginal estradiol cream for vaginal atrophy. Discussed lubrication with intercourse 
30-Nov-2020

## 2024-07-30 NOTE — HISTORY OF PRESENT ILLNESS
[FreeTextEntry1] : Problem 1) Persistent abnormal pap  Previous Therapy 1) pap 5/2021 negative HPV negative 2) Pap 5/2022 LSIL, HPV negative  3) Pap 5/2023 ASCUS HPV+ 4) Pap 7/2023 LSIL 5) Colposcopy 6/2023    a) 12 o clock ECC wnl  6) Pap 6/12/24    a) LSIL, HPV+ non 16/18   72 yo referred by Dr. Cordero for colposcopy after LSIL HPV+ pap 7/2024. Patient first had LSIL pap 5/2022 with one ASCUS pap in between 6/2023. Colposcopic biopsies have been normal thus far.

## 2024-08-05 ENCOUNTER — APPOINTMENT (OUTPATIENT)
Dept: DERMATOLOGY | Facility: CLINIC | Age: 74
End: 2024-08-05

## 2024-08-05 LAB — CORE LAB BIOPSY: NORMAL

## 2024-08-05 PROCEDURE — 96910 PHOTCHMTX TAR&UVB/PTRLTM&UVB: CPT

## 2024-08-06 NOTE — DISCUSSION/SUMMARY
[FreeTextEntry1] : July Fernandez. Is here for NBUVB Phototherapy treatment for Granuloma annulare (L92.0). starting dose is 150 MJ/cm2. Increasing by 10 % as tolerated twice a week, holding dose 1000 MJ/cm2 as Dr. Aidee Saravia prescribed. Patient tolerated treatment well, mineral oil was not applied.  Today's treatment: 08/05/2024 171.0 MJ 18 Sec  Treatment History:.  07/30/2024 152.0 MJ 0 min 18 secs

## 2024-08-07 ENCOUNTER — APPOINTMENT (OUTPATIENT)
Dept: DERMATOLOGY | Facility: CLINIC | Age: 74
End: 2024-08-07

## 2024-08-07 PROCEDURE — 96910 PHOTCHMTX TAR&UVB/PTRLTM&UVB: CPT

## 2024-08-07 NOTE — DISCUSSION/SUMMARY
[FreeTextEntry1] : YolandaGERMAN Fernandez. Is here for NBUVB Phototherapy treatment for Granuloma annulare (L92.0). starting dose is 150 MJ/cm2. Increasing by 10 % as tolerated twice a week, holding dose 1000 MJ/cm2 as Dr. Aidee Saravia prescribed. Patient tolerated treatment well, mineral oil was not applied.  Today's treatment: 08/07/2024 187.0 MJ  0 min 22 Sec  Treatment History: 08/05/2024 171.0 MJ 18 Sec 07/30/2024 152.0 MJ 0 min 18 secs.

## 2024-08-12 ENCOUNTER — APPOINTMENT (OUTPATIENT)
Dept: DERMATOLOGY | Facility: CLINIC | Age: 74
End: 2024-08-12
Payer: MEDICARE

## 2024-08-12 PROCEDURE — 96910 PHOTCHMTX TAR&UVB/PTRLTM&UVB: CPT

## 2024-08-12 NOTE — DISCUSSION/SUMMARY
[FreeTextEntry1] : July PORTER Rebecca. Is here for NBUVB Phototherapy treatment for Granuloma annulare (L92.0). starting dose is 150 MJ/cm2. Increasing by 10 % as tolerated twice a week, holding dose 1000 MJ/cm2 as Dr. Aidee Saravia prescribed. Patient tolerated treatment well, mineral oil was not applied.  Today's treatment: 08/12/2024 207.0 MJ 0 min 21 Sec  Treatment History: 08/07/2024 187.0 MJ 0 min 22 Sec 08/05/2024 171.0 MJ 18 Sec 07/30/2024 152.0 MJ 0 min 18 secs.

## 2024-08-14 ENCOUNTER — APPOINTMENT (OUTPATIENT)
Dept: DERMATOLOGY | Facility: CLINIC | Age: 74
End: 2024-08-14
Payer: MEDICARE

## 2024-08-14 ENCOUNTER — APPOINTMENT (OUTPATIENT)
Dept: INTERNAL MEDICINE | Facility: CLINIC | Age: 74
End: 2024-08-14
Payer: MEDICARE

## 2024-08-14 VITALS
TEMPERATURE: 97.8 F | SYSTOLIC BLOOD PRESSURE: 138 MMHG | HEIGHT: 62 IN | HEART RATE: 65 BPM | WEIGHT: 104 LBS | OXYGEN SATURATION: 99 % | BODY MASS INDEX: 19.14 KG/M2 | DIASTOLIC BLOOD PRESSURE: 77 MMHG

## 2024-08-14 DIAGNOSIS — F41.9 ANXIETY DISORDER, UNSPECIFIED: ICD-10-CM

## 2024-08-14 DIAGNOSIS — I10 ESSENTIAL (PRIMARY) HYPERTENSION: ICD-10-CM

## 2024-08-14 DIAGNOSIS — L92.0 GRANULOMA ANNULARE: ICD-10-CM

## 2024-08-14 PROCEDURE — 99214 OFFICE O/P EST MOD 30 MIN: CPT

## 2024-08-14 PROCEDURE — G2211 COMPLEX E/M VISIT ADD ON: CPT

## 2024-08-14 PROCEDURE — 96910 PHOTCHMTX TAR&UVB/PTRLTM&UVB: CPT

## 2024-08-14 NOTE — HISTORY OF PRESENT ILLNESS
[FreeTextEntry1] : pt here for follow up. - sob [de-identified] : 73F with a past medical history of HTN, HLD, osteoporosis, granuloma annularae (on phototherapy twice a week), anxiety, presenting for persistent SOB.   As per patient, symptoms began in February 2024 when she went to see her pulmonologist Dr. Ramon for an annual CT screening for a known pulmonary nodule. At that time he told her that she had inflammation in the lungs (as seen on CT) and diagnosed her with "pneumonia" for which she was given a 4 day course of steroids (does not recall the name or dose). She states she mainly took anti-inflammatories and tea and felt better over time.   However, recently over the past few weeks she has felt that her lungs are "tight" and that they "have something in them". She states she does not feel like she can take a deep breath. She is concered about her recent exposures including being around dust and near somone who was coughing last week. She is worried something may be wrong and that she might get pneumonia again.   She states she has been under a lot of stress recently. She saw her cardiologist recently who performed an echo that showed some diastolic dysfunction but no other issues however she was worried while awaitng the results. She also recently underwent colposcopy and cervical biopsy for LSIL on a recent pap. Results were begnign however she was anxious awaiting those results as well.   She otherwise denies dyspnea on exertion, stating she goes to the gym everyday for one to 3 classes and goes for long walks as well.   She also notes feeling "foggy" and that she "can't think clearly", almost as if she was in a fog. Her pulmonologist suggested she get a sleep study but states she didn't go because she wasn't comfortable doing it at home or in a hospital.

## 2024-08-14 NOTE — END OF VISIT
[] : Resident [FreeTextEntry3] : #sob- been seeing pulm dr. alee davies- abnormal ct chest . also has a lot of stress. pulm recommended sleep study but she declined. 1-3 gym classes a day without issue. been a few months. - discussed and she agrees- likely from anxiety about health issues, being alone, difficult finding a healthy relationship  #osteoporosis- see endo to evaluate   #abnormal pap - f/u gyn   #anxiety- has been having brain fog. feels processing information slower. she declined medication. wants to talk to therapist first

## 2024-08-14 NOTE — REVIEW OF SYSTEMS
[Shortness Of Breath] : shortness of breath [Skin Rash] : skin rash [Fever] : no fever [Chills] : no chills [Night Sweats] : no night sweats [Recent Change In Weight] : ~T no recent weight change [Chest Pain] : no chest pain [Palpitations] : no palpitations [Orthopnea] : no orthopnea [Wheezing] : no wheezing [Cough] : no cough [Dyspnea on Exertion] : no dyspnea on exertion [Abdominal Pain] : no abdominal pain [Nausea] : no nausea [Vomiting] : no vomiting [Dysuria] : no dysuria [Hematuria] : no hematuria [Joint Pain] : no joint pain [Muscle Pain] : no muscle pain

## 2024-08-14 NOTE — HEALTH RISK ASSESSMENT
[Little interest or pleasure doing things] : 1) Little interest or pleasure doing things [Feeling down, depressed, or hopeless] : 2) Feeling down, depressed, or hopeless [0] : 2) Feeling down, depressed, or hopeless: Not at all (0) [PHQ-9 Negative - No further assessment needed] : PHQ-9 Negative - No further assessment needed [Never] : Never [[id number]] : 0

## 2024-08-14 NOTE — HISTORY OF PRESENT ILLNESS
[FreeTextEntry1] : pt here for follow up. - sob [de-identified] : 73F with a past medical history of HTN, HLD, osteoporosis, granuloma annularae (on phototherapy twice a week), anxiety, presenting for persistent SOB.   As per patient, symptoms began in February 2024 when she went to see her pulmonologist Dr. Ramon for an annual CT screening for a known pulmonary nodule. At that time he told her that she had inflammation in the lungs (as seen on CT) and diagnosed her with "pneumonia" for which she was given a 4 day course of steroids (does not recall the name or dose). She states she mainly took anti-inflammatories and tea and felt better over time.   However, recently over the past few weeks she has felt that her lungs are "tight" and that they "have something in them". She states she does not feel like she can take a deep breath. She is concered about her recent exposures including being around dust and near somone who was coughing last week. She is worried something may be wrong and that she might get pneumonia again.   She states she has been under a lot of stress recently. She saw her cardiologist recently who performed an echo that showed some diastolic dysfunction but no other issues however she was worried while awaitng the results. She also recently underwent colposcopy and cervical biopsy for LSIL on a recent pap. Results were begnign however she was anxious awaiting those results as well.   She otherwise denies dyspnea on exertion, stating she goes to the gym everyday for one to 3 classes and goes for long walks as well.   She also notes feeling "foggy" and that she "can't think clearly", almost as if she was in a fog. Her pulmonologist suggested she get a sleep study but states she didn't go because she wasn't comfortable doing it at home or in a hospital.

## 2024-08-14 NOTE — ASSESSMENT
[FreeTextEntry1] : 73F with PMHx of HTN, HLD, Osteoporosis, Granuloma annularae, presenting for persistent reported difficulty breathing at rest. On exam able to take full breaths when prompted. Lungs are clear to auscultation. No overt signs of respiratory distress. Normal exercise tolerance. Saturating well on RA. Symptoms appear more related to anxiety over recent increased number of doctors' visits, exams. Feels overall tightness in shoulders as well.

## 2024-08-14 NOTE — HEALTH RISK ASSESSMENT
[Little interest or pleasure doing things] : 1) Little interest or pleasure doing things [Feeling down, depressed, or hopeless] : 2) Feeling down, depressed, or hopeless [0] : 2) Feeling down, depressed, or hopeless: Not at all (0) [PHQ-9 Negative - No further assessment needed] : PHQ-9 Negative - No further assessment needed [Never] : Never [[id number]] : 0

## 2024-08-14 NOTE — PHYSICAL EXAM
[No Acute Distress] : no acute distress [Well Nourished] : well nourished [Well Developed] : well developed [No Respiratory Distress] : no respiratory distress  [No Accessory Muscle Use] : no accessory muscle use [Clear to Auscultation] : lungs were clear to auscultation bilaterally [Normal Rate] : normal rate  [Regular Rhythm] : with a regular rhythm [Normal S1, S2] : normal S1 and S2 [No Joint Swelling] : no joint swelling [Coordination Grossly Intact] : coordination grossly intact [No Focal Deficits] : no focal deficits [Normal Gait] : normal gait [Normal Affect] : the affect was normal [Normal Insight/Judgement] : insight and judgment were intact [de-identified] : two annular rashes noted on inner tighs bilaterally

## 2024-08-14 NOTE — PLAN
[FreeTextEntry1] : #Anxiety  She has a lot of increased stress over a short amount of time. She wants to get back to some semblance of normal life and has more appointments scheduled (endocrinology) which may contribute more to her anxiety. Medication was discussed however patient declined stating she does not feel she needs to take it and wants to try other methods.   Plan  - set up appointment for psychotherapy   #HTN  BP within normal range today   Plan  - continue with Valsartan   #HLD - continue with atorvastatin 20mg   #Granuloma Annularae  Currently follows with dermatology and undergoes phototherapy twice a week   Plan  -f/u dermatology   #UTI  - RESOLVED

## 2024-08-14 NOTE — PHYSICAL EXAM
[No Acute Distress] : no acute distress [Well Nourished] : well nourished [Well Developed] : well developed [No Respiratory Distress] : no respiratory distress  [No Accessory Muscle Use] : no accessory muscle use [Clear to Auscultation] : lungs were clear to auscultation bilaterally [Normal Rate] : normal rate  [Regular Rhythm] : with a regular rhythm [Normal S1, S2] : normal S1 and S2 [No Joint Swelling] : no joint swelling [Coordination Grossly Intact] : coordination grossly intact [No Focal Deficits] : no focal deficits [Normal Gait] : normal gait [Normal Affect] : the affect was normal [Normal Insight/Judgement] : insight and judgment were intact [de-identified] : two annular rashes noted on inner tighs bilaterally

## 2024-08-15 NOTE — DISCUSSION/SUMMARY
[FreeTextEntry1] : July PORTER Rebecca. Is here for NBUVB Phototherapy treatment for Granuloma annulare (L92.0). starting dose is 150 MJ/cm2. Increasing by 10 % as tolerated twice a week, holding dose 1000 MJ/cm2 as Dr. Aidee Saravia prescribed. Patient tolerated treatment well, mineral oil was not applied.  Today's treatment: 08/14/2024 223.0 MJ 0 min 25 Sec  Treatment History: 08/12/2024 207.0 MJ 0 min 21 Sec 08/07/2024 187.0 MJ 0 min 22 Sec 08/05/2024 171.0 MJ 18 Sec 07/30/2024 152.0 MJ 0 min 18 secs.

## 2024-08-21 ENCOUNTER — APPOINTMENT (OUTPATIENT)
Dept: INTERNAL MEDICINE | Facility: CLINIC | Age: 74
End: 2024-08-21
Payer: MEDICARE

## 2024-08-21 ENCOUNTER — NON-APPOINTMENT (OUTPATIENT)
Age: 74
End: 2024-08-21

## 2024-08-21 ENCOUNTER — TRANSCRIPTION ENCOUNTER (OUTPATIENT)
Age: 74
End: 2024-08-21

## 2024-08-21 VITALS
BODY MASS INDEX: 18.95 KG/M2 | HEART RATE: 82 BPM | OXYGEN SATURATION: 96 % | WEIGHT: 103 LBS | DIASTOLIC BLOOD PRESSURE: 67 MMHG | SYSTOLIC BLOOD PRESSURE: 97 MMHG | HEIGHT: 62 IN | TEMPERATURE: 97.6 F

## 2024-08-21 DIAGNOSIS — F41.9 ANXIETY DISORDER, UNSPECIFIED: ICD-10-CM

## 2024-08-21 DIAGNOSIS — R91.1 SOLITARY PULMONARY NODULE: ICD-10-CM

## 2024-08-21 DIAGNOSIS — R06.00 DYSPNEA, UNSPECIFIED: ICD-10-CM

## 2024-08-21 PROCEDURE — 99215 OFFICE O/P EST HI 40 MIN: CPT | Mod: GC

## 2024-08-21 PROCEDURE — G2211 COMPLEX E/M VISIT ADD ON: CPT

## 2024-08-21 RX ORDER — SERTRALINE HYDROCHLORIDE 50 MG/1
50 TABLET, FILM COATED ORAL
Qty: 30 | Refills: 3 | Status: ACTIVE | COMMUNITY
Start: 2024-08-21 | End: 1900-01-01

## 2024-08-21 NOTE — ASSESSMENT
[FreeTextEntry1] : 73 y.o F w/ PMHx of HLD, HTN, granuloma annularae (previously on phototherapy x2/week) and VIC presents for concerns about shortness of breath.

## 2024-08-21 NOTE — HISTORY OF PRESENT ILLNESS
[FreeTextEntry1] : pt here because of she experience pain. [de-identified] : 73 y.o F w/ PMHx of HLD, HTN, granuloma annularae (previously on phototherapy x2/week) and VIC presents for concerns about shortness of breath. Pt was recently seen on Aug 14th for similar complaints, thought to be related to her stress and anxiety. Pt was referred for psychotherapy but claims did not get any follow up. Since last visit, pt had episode of mechanical fall after having a argument with a date resulting her to become very agitated and walk fast, resulting in tripping and falling to the ground. Denies head strike or LOC. Pt claims her SOB is worse when she is agitated. ROS otherwise neg

## 2024-08-21 NOTE — HISTORY OF PRESENT ILLNESS
[FreeTextEntry1] : pt here because of she experience pain. [de-identified] : 73 y.o F w/ PMHx of HLD, HTN, granuloma annularae (previously on phototherapy x2/week) and VIC presents for concerns about shortness of breath. Pt was recently seen on Aug 14th for similar complaints, thought to be related to her stress and anxiety. Pt was referred for psychotherapy but claims did not get any follow up. Since last visit, pt had episode of mechanical fall after having a argument with a date resulting her to become very agitated and walk fast, resulting in tripping and falling to the ground. Denies head strike or LOC. Pt claims her SOB is worse when she is agitated. ROS otherwise neg

## 2024-08-21 NOTE — END OF VISIT
[] : Resident [FreeTextEntry3] : HTN, granuloma annularae, asthma here for follow up. Had a fall and is again worried she has PNA. Will start sertraline. No suspicion for active pulmonary disorder, but she does have some chronic symptoms that she reports improves with semiannual mucolytics. Needs new pulmonologist so will refer to establish care. 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 Will start SSRI and uptitrate. Counselled that she should continue if no side effects as it will take time to take effect. At next visit would go over sleep hygiene in detail as she described poor sleep towards the end of our discussion. [Time Spent: ___ minutes] : I have spent [unfilled] minutes of time on the encounter which excludes teaching and separately reported services.

## 2024-08-21 NOTE — HEALTH RISK ASSESSMENT
[Little interest or pleasure doing things] : 1) Little interest or pleasure doing things [Feeling down, depressed, or hopeless] : 2) Feeling down, depressed, or hopeless [0] : 2) Feeling down, depressed, or hopeless: Not at all (0) [PHQ-9 Negative - No further assessment needed] : PHQ-9 Negative - No further assessment needed [Never] : Never [DQU6Nkgdf] : 0

## 2024-08-21 NOTE — HEALTH RISK ASSESSMENT
[Little interest or pleasure doing things] : 1) Little interest or pleasure doing things [Feeling down, depressed, or hopeless] : 2) Feeling down, depressed, or hopeless [0] : 2) Feeling down, depressed, or hopeless: Not at all (0) [PHQ-9 Negative - No further assessment needed] : PHQ-9 Negative - No further assessment needed [Never] : Never [[id number]] : 0

## 2024-08-26 ENCOUNTER — APPOINTMENT (OUTPATIENT)
Dept: ENDOCRINOLOGY | Facility: CLINIC | Age: 74
End: 2024-08-26

## 2024-08-28 ENCOUNTER — APPOINTMENT (OUTPATIENT)
Dept: DERMATOLOGY | Facility: CLINIC | Age: 74
End: 2024-08-28
Payer: MEDICARE

## 2024-08-28 DIAGNOSIS — L92.0 GRANULOMA ANNULARE: ICD-10-CM

## 2024-08-28 DIAGNOSIS — L81.0 POSTINFLAMMATORY HYPERPIGMENTATION: ICD-10-CM

## 2024-08-28 PROCEDURE — 99214 OFFICE O/P EST MOD 30 MIN: CPT

## 2024-08-28 NOTE — ASSESSMENT
[FreeTextEntry1] : #Generalized granuloma annulare #PIH -chronic, improving -I have discussed the chronic nature and course of this condition -previously discussed tx options including topicals, nbUVB, systemic meds including hydroxychloroquine, dapsone, isotretinoin, TNF alpha inhibitors. Reviewed risks, potential SE of each. Pt opts for nbUVB phototherapy continue nbUVB phototherapy 2 times per week, will reassess in 2 mos; if improving in the fall can start tapering -continue triamcinolone 0.1% cream BID to affected areas on the body until rash resolved. Do not use on face, axillae, groin. -Proper medication use and side effects discussed, including cutaneous atrophy, telangiectasias, and striae. Avoid long-term use. -follow up with PCP, specialists for medical comorbidities and routine age appropriate screening -counseled on nature of PIH - will slowly fade with time  RTC 2 months

## 2024-08-28 NOTE — PHYSICAL EXAM
[Alert] : alert [Oriented x 3] : ~L oriented x 3 [Declined] : declined [FreeTextEntry3] : Focused exam: -mostly hyperpigmented, few pink papules, patches on the bl upper and lower extremities, abdomen, back

## 2024-08-28 NOTE — HISTORY OF PRESENT ILLNESS
[FreeTextEntry1] : GA [de-identified] : 74yo F presents for follow up, last seen 6/14/24 generalized GA started nbUVB with improvement has a few active areas on the legs, arms but overall doing much better using triam 0.1% cream prn

## 2024-09-03 ENCOUNTER — APPOINTMENT (OUTPATIENT)
Dept: DERMATOLOGY | Facility: CLINIC | Age: 74
End: 2024-09-03
Payer: MEDICARE

## 2024-09-03 PROCEDURE — 96910 PHOTCHMTX TAR&UVB/PTRLTM&UVB: CPT

## 2024-09-03 NOTE — DISCUSSION/SUMMARY
[FreeTextEntry1] : July Powellspan. Is here for NBUVB Phototherapy treatment for Granuloma annulare (L92.0). starting dose is 150 MJ/cm2. Increasing by 10 % as tolerated twice a week, holding dose 1000 MJ/cm2 as Dr. Aidee Saravia prescribed. Patient tolerated treatment well, mineral oil was not applied.  Today's treatment: 09/03/2024 267.00 MJ 0 min 31 sec  Treatment History:  08/28/2024 242.00 MJ 0 min 29 sec 08/14/2024 223.0 MJ 0 min 25 Sec 08/12/2024 207.0 MJ 0 min 21 Sec 08/07/2024 187.0 MJ 0 min 22 Sec 08/05/2024 171.0 MJ 18 Sec 07/30/2024 152.0 MJ 0 min 18 secs.

## 2024-09-05 ENCOUNTER — APPOINTMENT (OUTPATIENT)
Dept: PULMONOLOGY | Facility: CLINIC | Age: 74
End: 2024-09-05
Payer: MEDICARE

## 2024-09-05 VITALS
HEIGHT: 62 IN | SYSTOLIC BLOOD PRESSURE: 100 MMHG | WEIGHT: 104 LBS | HEART RATE: 62 BPM | DIASTOLIC BLOOD PRESSURE: 60 MMHG | OXYGEN SATURATION: 98 % | BODY MASS INDEX: 19.14 KG/M2 | TEMPERATURE: 97.3 F

## 2024-09-05 PROCEDURE — 99204 OFFICE O/P NEW MOD 45 MIN: CPT | Mod: 25

## 2024-09-05 PROCEDURE — ZZZZZ: CPT

## 2024-09-05 PROCEDURE — 94729 DIFFUSING CAPACITY: CPT

## 2024-09-05 PROCEDURE — 94375 RESPIRATORY FLOW VOLUME LOOP: CPT

## 2024-09-05 PROCEDURE — 94727 GAS DIL/WSHOT DETER LNG VOL: CPT

## 2024-09-05 NOTE — RESULTS/DATA
[TextEntry] : RADIOLOGY May 2024 CT chest Comparison: 4/8/2023, and 3/26/2021 Findings: Lungs and airways: Image numbers for description of nodule location refer to thin section axial series number 5. There is a new tubular shaped branching structure with peripheral tree-in-bud micronodules noted within the lateral aspect of the apical segment of the right upper lobe and an area of previous mild mucous plugging within a bronchus that is immediately adjacent.) 5:80-88). Stable 3 mm nodule within the superior segment to the right lower lobe (5:129). New tree-in-bud micronodules within the lateral segment of the right middle lobe (5:185) there has been improvement in mucous plugging as well as resolution of linear atelectasis within the medial segment of the right middle lobe. Persistent bibasilar subsegmental bands of atelectasis involving the medial aspect of the lower lung zones unchanged. There has been resolution of linear atelectasis and groundglass opacities within the superior segment to the right lower lobe. No developing suspicious parenchymal nodules or masses. No air trapping.  Trachea and central bronchi patent.  Pleura: The pleural spaces are clear.  Base of neck, mediastinum and heart: The thyroid gland is normal. No mediastinal, hilar or axillary lymphadenopathy is seen. The heart and pericardium are within normal limits.  Vessels/Coronary artery calcification: Severe calcified mural plaque involving the thoracic aorta. No evidence of aortic aneurysm. Main pulmonary artery is of normal caliber. Severe coronary artery calcification.  Soft tissues: Coarse right breast calcification/clip again noted.  Abdomen: Multiple coarse calcified hepatic granulomata  Bones: The visualized bony thorax is within normal limits. Impression:  1. Waxing and waning areas of mucous plugging and tree-in-bud micronodules compatible with mild small and large airways inflammation. No developing suspicious parenchymal nodule or mass. No significant air-trapping. 2. Severe calcified coronary artery and aortic atheromatous plaque formation.	   EXAM:  CT CHEST PROCEDURE DATE:  03/26/2021  Comparison: Chest radiograph 3/9/2021  Findings:  Lungs and large airways: 2 mm solid nodule in the superior segment of the right lower lobe (5:139).  2 mm solid nodule in the left lower lobe lateral segment (5:225).  Pleura:  No pleural effusion.  Mediastinum and hilar regions: No lymphadenopathy.  Heart and pericardium:  Heart size is normal. No pericardial effusion.  Vessels:  No aneurysmal dilation of the visualized aorta. Moderate calcified mural plaques throughout the thoracic and abdominal aorta.  Chest wall and lower neck:  Punctate right breast microcalcification (3:31). Clinical and mammographic correlation.  Upper abdomen: Moderate sized type I hiatal hernia. Simple fluid attenuation 3 cm segment 7 hepatic dome cyst.  Bones: Mild degenerative disease of the spine.  Impression:  1. No acute pulmonary pathology or significant air-trapping.  2. 2 solid micronodules delineated above. As per Fleischner society 2017 guidelines, in a low risk individual, no additional imaging surveillance is indicated. If this patient is high risk, optional 12 month surveillance thoracic CT is suggested.   PFT 9/5/2024 FVC 3.32 L, 128% predicted FEV1 2.25 L, 115% predicted FEV1/FVC 68% TLC_N25.1 7 L 117% predicted RV_N21.8 5 L, 95% predicted DLCO unadjusted for hemoglobin 12.17 units, 63% predicted Impression: Normal spirometry, lung volumes.  Mildly reduced diffusing capacity.  Compared with the initial study in 2015, spirometry is essentially unchanged; DLCO has decreased by 23%.  Compared with the previous test in 2021, once again spirometry is essentially unchanged; DLCO is unchanged   EKG / ECHO  5/23/24 	Treadmill Exercise Stress Echocardiogram Report CONCLUSIONS  1. Normal exercise stress echocardiogram. with normal augmentation of left ventricular systolic function. 2. No exercise induced electrocardiographic evidence of myocardial ischemia. 3. Positive electrocardiographic evidence of ischemia at or near maximal predicted heart rate. 4. No echocardiographic evidence of exercise induced ischemia. 5. Additional ischemic changes: 1-2 mm horizontal ST segment depressions in 2, 3, F, v4-V6 resolved by 4 minutes into recovery. 6. Grade 2, pseudonormal pattern, left ventricular diastolic function immediate post-stress.  RESTING ECHO 1. Left ventricular cavity is normal in size. Left ventricular systolic function is normal with an ejection fraction of 66 % by Dennis's method of disks. 2. Normal right ventricular cavity size and normal systolic function. 3. Trace mitral regurgitation. 4. Mild tricuspid regurgitation. 5. Mild pulmonic regurgitation. 6. No pericardial effusion seen.  MICRO      BACTERIAL:       MYCOBACTERIAL:           FUNGAL:     PATH     OTHER LABS OF NOTE

## 2024-09-05 NOTE — ASSESSMENT
[FreeTextEntry1] : 73 y.o F w/ PMHx of HLD, HTN, granuloma annularae (previously on phototherapy x2/week) and VIC referred by her PCP Dr. Rangel for concerns about shortness of breath and lung nodules  # Dyspnea. Normal exercise stress test (achieved 12 METs). Normal PFT. --pt reassured  # Lung nodules. Likely mucous plugs --continue monitoring: repeat CT in April 2025  # Right carotid bruit --Carotid U/S  # Health mainteance --check strep pneumo IgGs. If < 70% +, repeat pneumococcal vaccination (unclear if the last one was 5 yrs ago or more recently) --declines influenza and covid vaccines for now --will obtain RSV vaccine at local pharmacy

## 2024-09-05 NOTE — HISTORY OF PRESENT ILLNESS
[Former] : former [TextBox_4] : 73 y.o F w/ PMHx of HLD, HTN, granuloma annularae (previously on phototherapy x2/week) and VIC referred by her PCP Dr. Rangel for concerns about shortness of breath.  Concerned about abnormal chest CT Goes to the gym almost daily: zoarmena, pilates, others Feels shortness of breath with exercise sometimes. Thinks might be stress related No cough Declines flu shots and COVID shots Last pneumonia shot 5 yrs ago (though thinks may be more recent) Was not aware of RSV vaccine  [TextBox_13] : 10 [YearQuit] : 2014

## 2024-09-05 NOTE — PHYSICAL EXAM
[TextEntry] : Gen: Pleasant in no distress MSK: OA of finger joints HEENT: Sclera non-icteric, conjunctiva non-injected; oropharynx clear w/o thrush; + right carotid bruit Neck: supple w/o cervical LAD; no bruits CV: Regular rate and rhythm, no murmurs, rubs or gallops Lungs: CTAB, no rales, wheezes, squeaks or rhonchi Extremities: no clubbing, cyanosis or edema Skin: no rash Psych: normal mood and affect Neuro:  non-focal

## 2024-09-06 ENCOUNTER — APPOINTMENT (OUTPATIENT)
Dept: ULTRASOUND IMAGING | Facility: HOSPITAL | Age: 74
End: 2024-09-06
Payer: MEDICARE

## 2024-09-06 ENCOUNTER — OUTPATIENT (OUTPATIENT)
Dept: OUTPATIENT SERVICES | Facility: HOSPITAL | Age: 74
LOS: 1 days | End: 2024-09-06

## 2024-09-06 PROCEDURE — 93880 EXTRACRANIAL BILAT STUDY: CPT | Mod: 26

## 2024-09-06 PROCEDURE — 93880 EXTRACRANIAL BILAT STUDY: CPT

## 2024-09-09 ENCOUNTER — APPOINTMENT (OUTPATIENT)
Dept: DERMATOLOGY | Facility: CLINIC | Age: 74
End: 2024-09-09

## 2024-09-11 ENCOUNTER — APPOINTMENT (OUTPATIENT)
Dept: DERMATOLOGY | Facility: CLINIC | Age: 74
End: 2024-09-11
Payer: MEDICARE

## 2024-09-11 PROCEDURE — 96910 PHOTCHMTX TAR&UVB/PTRLTM&UVB: CPT

## 2024-09-12 LAB
DEPRECATED S PNEUM 1 IGG SER-MCNC: 0.3 MCG/ML
DEPRECATED S PNEUM12 AB SER-ACNC: <0.1 MCG/ML
DEPRECATED S PNEUM14 AB SER-ACNC: 1.5 MCG/ML
DEPRECATED S PNEUM17 IGG SER IA-MCNC: 0.3 MCG/ML
DEPRECATED S PNEUM18 IGG SER IA-MCNC: 4.3 MCG/ML
DEPRECATED S PNEUM19 IGG SER-MCNC: 0.7 MCG/ML
DEPRECATED S PNEUM19 IGG SER-MCNC: 0.8 MCG/ML
DEPRECATED S PNEUM2 IGG SER-MCNC: 0.1 MCG/ML
DEPRECATED S PNEUM20 IGG SER-MCNC: 1.7 MCG/ML
DEPRECATED S PNEUM22 IGG SER-MCNC: 0.4 MCG/ML
DEPRECATED S PNEUM23 AB SER-ACNC: 2.2 MCG/ML
DEPRECATED S PNEUM3 AB SER-ACNC: 0.1 MCG/ML
DEPRECATED S PNEUM34 IGG SER-MCNC: 0.7 MCG/ML
DEPRECATED S PNEUM4 AB SER-ACNC: 0.2 MCG/ML
DEPRECATED S PNEUM5 IGG SER-MCNC: 0.3 MCG/ML
DEPRECATED S PNEUM6 IGG SER-MCNC: 0.5 MCG/ML
DEPRECATED S PNEUM7 IGG SER-ACNC: 2.5 MCG/ML
DEPRECATED S PNEUM8 AB SER-ACNC: 4.5 MCG/ML
DEPRECATED S PNEUM9 AB SER-ACNC: 0.2 MCG/ML
DEPRECATED S PNEUM9 IGG SER-MCNC: 0.1 MCG/ML
IMMUNOLOGIST REVIEW: NORMAL
STREPTOCOCCUS PNEUMONIAE SEROTYPE 11A: 0.2 MCG/ML
STREPTOCOCCUS PNEUMONIAE SEROTYPE 15B: 1 MCG/ML
STREPTOCOCCUS PNEUMONIAE SEROTYPE 33F: 1.8 MCG/ML

## 2024-09-16 ENCOUNTER — APPOINTMENT (OUTPATIENT)
Dept: DERMATOLOGY | Facility: CLINIC | Age: 74
End: 2024-09-16
Payer: MEDICARE

## 2024-09-16 PROCEDURE — 96910 PHOTCHMTX TAR&UVB/PTRLTM&UVB: CPT

## 2024-09-16 NOTE — DISCUSSION/SUMMARY
[FreeTextEntry1] : July Fernandez. Is here for NBUVB Phototherapy treatment for Granuloma annulare (L92.0). starting dose is 150 MJ/cm2. Increasing by 10 % as tolerated twice a week, holding dose 1000 MJ/cm2 as Dr. Aidee Saravia prescribed. Patient tolerated treatment well, mineral oil was not applied.  Today's treatment: 09/16/2024 326.0 MJ 0 min 36 sec   Treatment History: 09/11/2024 300.00 MJ 0 min 31 sec 09/03/2024 267.00 MJ 0 min 31 sec  08/28/2024 242.00 MJ 0 min 29 sec 08/14/2024 223.0 MJ 0 min 25 Sec 08/12/2024 207.0 MJ 0 min 21 Sec 08/07/2024 187.0 MJ 0 min 22 Sec 08/05/2024 171.0 MJ 18 Sec 07/30/2024 152.0 MJ 0 min 18 secs.

## 2024-09-18 ENCOUNTER — APPOINTMENT (OUTPATIENT)
Dept: DERMATOLOGY | Facility: CLINIC | Age: 74
End: 2024-09-18
Payer: MEDICARE

## 2024-09-18 PROCEDURE — 96910 PHOTCHMTX TAR&UVB/PTRLTM&UVB: CPT

## 2024-09-23 ENCOUNTER — APPOINTMENT (OUTPATIENT)
Dept: DERMATOLOGY | Facility: CLINIC | Age: 74
End: 2024-09-23
Payer: MEDICARE

## 2024-09-23 PROCEDURE — 96910 PHOTCHMTX TAR&UVB/PTRLTM&UVB: CPT

## 2024-09-25 ENCOUNTER — APPOINTMENT (OUTPATIENT)
Dept: DERMATOLOGY | Facility: CLINIC | Age: 74
End: 2024-09-25
Payer: MEDICARE

## 2024-09-25 PROCEDURE — 96910 PHOTCHMTX TAR&UVB/PTRLTM&UVB: CPT

## 2024-09-26 ENCOUNTER — APPOINTMENT (OUTPATIENT)
Dept: PULMONOLOGY | Facility: CLINIC | Age: 74
End: 2024-09-26
Payer: MEDICARE

## 2024-09-26 VITALS
WEIGHT: 104 LBS | TEMPERATURE: 97.9 F | DIASTOLIC BLOOD PRESSURE: 76 MMHG | BODY MASS INDEX: 19.14 KG/M2 | HEIGHT: 62 IN | SYSTOLIC BLOOD PRESSURE: 128 MMHG | HEART RATE: 68 BPM | OXYGEN SATURATION: 98 %

## 2024-09-26 DIAGNOSIS — Z23 ENCOUNTER FOR IMMUNIZATION: ICD-10-CM

## 2024-09-26 PROCEDURE — G2211 COMPLEX E/M VISIT ADD ON: CPT

## 2024-09-26 PROCEDURE — 99213 OFFICE O/P EST LOW 20 MIN: CPT

## 2024-09-26 PROCEDURE — G0009: CPT

## 2024-09-26 PROCEDURE — 90732 PPSV23 VACC 2 YRS+ SUBQ/IM: CPT

## 2024-09-26 NOTE — RESULTS/DATA
[TextEntry] : RADIOLOGY 9/12/24 Carotid U/S: IMPRESSION: No significant hemodynamic stenosis of either carotid artery.  May 2024 CT chest Comparison: 4/8/2023, and 3/26/2021 Findings: Lungs and airways: Image numbers for description of nodule location refer to thin section axial series number 5. There is a new tubular shaped branching structure with peripheral tree-in-bud micronodules noted within the lateral aspect of the apical segment of the right upper lobe and an area of previous mild mucous plugging within a bronchus that is immediately adjacent.) 5:80-88). Stable 3 mm nodule within the superior segment to the right lower lobe (5:129). New tree-in-bud micronodules within the lateral segment of the right middle lobe (5:185) there has been improvement in mucous plugging as well as resolution of linear atelectasis within the medial segment of the right middle lobe. Persistent bibasilar subsegmental bands of atelectasis involving the medial aspect of the lower lung zones unchanged. There has been resolution of linear atelectasis and groundglass opacities within the superior segment to the right lower lobe. No developing suspicious parenchymal nodules or masses. No air trapping.  Trachea and central bronchi patent.  Pleura: The pleural spaces are clear.  Base of neck, mediastinum and heart: The thyroid gland is normal. No mediastinal, hilar or axillary lymphadenopathy is seen. The heart and pericardium are within normal limits.  Vessels/Coronary artery calcification: Severe calcified mural plaque involving the thoracic aorta. No evidence of aortic aneurysm. Main pulmonary artery is of normal caliber. Severe coronary artery calcification.  Soft tissues: Coarse right breast calcification/clip again noted.  Abdomen: Multiple coarse calcified hepatic granulomata  Bones: The visualized bony thorax is within normal limits. Impression:  1. Waxing and waning areas of mucous plugging and tree-in-bud micronodules compatible with mild small and large airways inflammation. No developing suspicious parenchymal nodule or mass. No significant air-trapping. 2. Severe calcified coronary artery and aortic atheromatous plaque formation.	   EXAM:  CT CHEST PROCEDURE DATE:  03/26/2021  Comparison: Chest radiograph 3/9/2021  Findings:  Lungs and large airways: 2 mm solid nodule in the superior segment of the right lower lobe (5:139).  2 mm solid nodule in the left lower lobe lateral segment (5:225).  Pleura:  No pleural effusion.  Mediastinum and hilar regions: No lymphadenopathy.  Heart and pericardium:  Heart size is normal. No pericardial effusion.  Vessels:  No aneurysmal dilation of the visualized aorta. Moderate calcified mural plaques throughout the thoracic and abdominal aorta.  Chest wall and lower neck:  Punctate right breast microcalcification (3:31). Clinical and mammographic correlation.  Upper abdomen: Moderate sized type I hiatal hernia. Simple fluid attenuation 3 cm segment 7 hepatic dome cyst.  Bones: Mild degenerative disease of the spine.  Impression:  1. No acute pulmonary pathology or significant air-trapping.  2. 2 solid micronodules delineated above. As per Fleischner society 2017 guidelines, in a low risk individual, no additional imaging surveillance is indicated. If this patient is high risk, optional 12 month surveillance thoracic CT is suggested.   PFT 9/5/2024 FVC 3.32 L, 128% predicted FEV1 2.25 L, 115% predicted FEV1/FVC 68% TLC_N25.1 7 L 117% predicted RV_N21.8 5 L, 95% predicted DLCO unadjusted for hemoglobin 12.17 units, 63% predicted Impression: Normal spirometry, lung volumes.  Mildly reduced diffusing capacity.  Compared with the initial study in 2015, spirometry is essentially unchanged; DLCO has decreased by 23%.  Compared with the previous test in 2021, once again spirometry is essentially unchanged; DLCO is unchanged   EKG / ECHO  5/23/24 	Treadmill Exercise Stress Echocardiogram Report CONCLUSIONS  1. Normal exercise stress echocardiogram. with normal augmentation of left ventricular systolic function. 2. No exercise induced electrocardiographic evidence of myocardial ischemia. 3. Positive electrocardiographic evidence of ischemia at or near maximal predicted heart rate. 4. No echocardiographic evidence of exercise induced ischemia. 5. Additional ischemic changes: 1-2 mm horizontal ST segment depressions in 2, 3, F, v4-V6 resolved by 4 minutes into recovery. 6. Grade 2, pseudonormal pattern, left ventricular diastolic function immediate post-stress.  RESTING ECHO 1. Left ventricular cavity is normal in size. Left ventricular systolic function is normal with an ejection fraction of 66 % by Dennis's method of disks. 2. Normal right ventricular cavity size and normal systolic function. 3. Trace mitral regurgitation. 4. Mild tricuspid regurgitation. 5. Mild pulmonic regurgitation. 6. No pericardial effusion seen.  MICRO      BACTERIAL:       MYCOBACTERIAL:           FUNGAL:     PATH     OTHER LABS OF NOTE est  	Result  	Flag	Reference	Goal  	Streptococcus pneumoniae serotype 1	0.3 mcg/mL	 	>=1.0	  	Streptococcus pneumoniae serotype 10A	0.7 mcg/mL	 	>=1.0	  	Streptococcus pneumoniae serotype 11A	0.2 mcg/mL	 	>=1.0	  	Streptococcus pneumoniae serotype 12F	<0.1 mcg/mL	 	>=1.0	  	Streptococcus pneumoniae serotype 14	1.5 mcg/mL	 	>=1.0	  	Streptococcus pneumoniae serotype 15B	1.0 mcg/mL	 	>=1.0	  	Streptococcus pneumoniae serotype 17F	0.3 mcg/mL	 	>=1.0	  	Streptococcus pneumoniae serotype 18C	4.3 mcg/mL	 	>=1.0	  	Streptococcus pneumoniae serotype 19A	0.8 mcg/mL	 	>=1.0	  	Streptococcus pneumoniae serotype 19F	0.7 mcg/mL	 	>=1.0	  	Streptococcus pneumoniae serotype 2	0.1 mcg/mL	 	>=1.0	  	Streptococcus pneumoniae serotype 20	1.7 mcg/mL	 	>=1.0	  	Streptococcus pneumoniae serotype 22F	0.4 mcg/mL	 	>=1.0	  	Streptococcus pneumoniae serotype 23F	2.2 mcg/mL	 	>=1.0	  	Streptococcus pneumoniae serotype 3	0.1 mcg/mL	 	>=1.0	  	Streptococcus pneumoniae serotype 33F	1.8 mcg/mL	 	>=1.0	  	Streptococcus pneumoniae serotype 4	0.2 mcg/mL	 	>=1.0	  	Streptococcus pneumoniae serotype 5	0.3 mcg/mL	 	>=1.0	  	Streptococcus pneumoniae serotype 6B	0.5 mcg/mL	 	>=1.0	  	Streptococcus pneumoniae serotype 7F	2.5 mcg/mL	 	>=1.0	  	Streptococcus pneumoniae serotype 8	4.5 mcg/mL	 	>=1.0	  	Streptococcus pneumoniae serotype 9N	0.2 mcg/mL	 	>=1.0	  	Streptococcus pneumoniae serotype 9V	0.1 mcg/mL	 	>=1.0

## 2024-09-26 NOTE — HISTORY OF PRESENT ILLNESS
[Former] : former [TextBox_4] : 73 y.o F w/ PMHx of HLD, HTN, granuloma annularae (previously on phototherapy x2/week) and VIC referred by her PCP Dr. Rangel for concerns about shortness of breath.  Concerned about abnormal chest CT Goes to the gym almost daily: zoarmena, pilates, others Feels shortness of breath with exercise sometimes. Thinks might be stress related No cough Declines flu shots and COVID shots Last pneumonia shot 5 yrs ago (though thinks may be more recent) Was not aware of RSV vaccine  9/26/24 Feels well Here for f/u of carotid u/s (no stenosis) and pneumococcal Ab titers (low) [TextBox_13] : 10 [YearQuit] : 2014

## 2024-09-26 NOTE — ASSESSMENT
[FreeTextEntry1] : 73 y.o F w/ PMHx of HLD, HTN, granuloma annularae (previously on phototherapy x2/week) and VIC referred by her PCP Dr. Rangel for concerns about shortness of breath and lung nodules  # Dyspnea. Normal exercise stress test (achieved 12 METs). Normal PFT. --pt reassured  # Lung nodules. Likely mucous plugs --continue monitoring: repeat CT in April 2025  # Right carotid bruit. Carotid U/S: no hemodynamically sig stenosis  # Health mainteance --checked strep pneumo IgGs.Low. PPSV23 administered today (9/26/24) --declines influenza and covid vaccines for now --will obtain RSV vaccine at local pharmacy  FU in April 2025 after CT chest

## 2024-09-30 ENCOUNTER — APPOINTMENT (OUTPATIENT)
Dept: DERMATOLOGY | Facility: CLINIC | Age: 74
End: 2024-09-30
Payer: MEDICARE

## 2024-09-30 PROCEDURE — 96910 PHOTCHMTX TAR&UVB/PTRLTM&UVB: CPT

## 2024-09-30 NOTE — DISCUSSION/SUMMARY
[FreeTextEntry1] : July Fernandez. Is here for NBUVB Phototherapy treatment for Granuloma annulare (L92.0). starting dose is 150 MJ/cm2. Increasing by 10 % as tolerated twice a week, holding dose 1000 MJ/cm2 as Dr. Aidee Saravia prescribed. No PA required. Patient tolerated treatment well, mineral oil was not applied.  Today's treatment: 09/30/2024 473.0 MJ 0 min 49 sec  Treatment History: 09/25/2024 432.0 MJ 0 min 46 sec 09/23/2024 395.0 MJ 0 min 42 sec 09/18/2024 358.0 MJ 0 min 39 sec 09/16/2024 326.0 MJ 0 min 36 sec 09/11/2024 300.00 MJ 0 min 31 sec 09/03/2024 267.00 MJ 0 min 31 sec  08/28/2024 242.00 MJ 0 min 29 sec 08/14/2024 223.0 MJ 0 min 25 Sec 08/12/2024 207.0 MJ 0 min 21 Sec 08/07/2024 187.0 MJ 0 min 22 Sec 08/05/2024 171.0 MJ 18 Sec 07/30/2024 152.0 MJ 0 min 18 secs.  used

## 2024-10-02 ENCOUNTER — APPOINTMENT (OUTPATIENT)
Dept: DERMATOLOGY | Facility: CLINIC | Age: 74
End: 2024-10-02
Payer: MEDICARE

## 2024-10-02 PROCEDURE — 96910 PHOTCHMTX TAR&UVB/PTRLTM&UVB: CPT

## 2024-10-02 NOTE — DISCUSSION/SUMMARY
[FreeTextEntry1] : July Fernandez. Is here for NBUVB Phototherapy treatment for Granuloma annulare (L92.0). starting dose is 150 MJ/cm2. Increasing by 10 % as tolerated twice a week, holding dose 1000 MJ/cm2 as Dr. Aidee Saravia prescribed. No PA required. Patient tolerated treatment well, mineral oil was not applied.  Today's treatment: 10/02/2024 518.0 MJ 1 min 1 sec  Treatment History:  09/30/2024 473.0 MJ 0 min 49 sec 09/25/2024 432.0 MJ 0 min 46 sec 09/23/2024 395.0 MJ 0 min 42 sec 09/18/2024 358.0 MJ 0 min 39 sec 09/16/2024 326.0 MJ 0 min 36 sec 09/11/2024 300.00 MJ 0 min 31 sec 09/03/2024 267.00 MJ 0 min 31 sec  08/28/2024 242.00 MJ 0 min 29 sec 08/14/2024 223.0 MJ 0 min 25 Sec 08/12/2024 207.0 MJ 0 min 21 Sec 08/07/2024 187.0 MJ 0 min 22 Sec 08/05/2024 171.0 MJ 18 Sec 07/30/2024 152.0 MJ 0 min 18 secs.

## 2024-10-07 ENCOUNTER — APPOINTMENT (OUTPATIENT)
Dept: DERMATOLOGY | Facility: CLINIC | Age: 74
End: 2024-10-07
Payer: MEDICARE

## 2024-10-07 PROCEDURE — 96910 PHOTCHMTX TAR&UVB/PTRLTM&UVB: CPT

## 2024-10-09 ENCOUNTER — APPOINTMENT (OUTPATIENT)
Dept: DERMATOLOGY | Facility: CLINIC | Age: 74
End: 2024-10-09
Payer: MEDICARE

## 2024-10-09 PROCEDURE — 96910 PHOTCHMTX TAR&UVB/PTRLTM&UVB: CPT

## 2024-10-29 ENCOUNTER — APPOINTMENT (OUTPATIENT)
Dept: INTERNAL MEDICINE | Facility: CLINIC | Age: 74
End: 2024-10-29
Payer: MEDICARE

## 2024-10-29 VITALS
WEIGHT: 102 LBS | TEMPERATURE: 97.3 F | BODY MASS INDEX: 17.42 KG/M2 | SYSTOLIC BLOOD PRESSURE: 156 MMHG | HEIGHT: 64 IN | OXYGEN SATURATION: 98 % | DIASTOLIC BLOOD PRESSURE: 69 MMHG | HEART RATE: 72 BPM

## 2024-10-29 VITALS — SYSTOLIC BLOOD PRESSURE: 144 MMHG | DIASTOLIC BLOOD PRESSURE: 78 MMHG

## 2024-10-29 DIAGNOSIS — I10 ESSENTIAL (PRIMARY) HYPERTENSION: ICD-10-CM

## 2024-10-29 DIAGNOSIS — B34.9 VIRAL INFECTION, UNSPECIFIED: ICD-10-CM

## 2024-10-29 DIAGNOSIS — F41.9 ANXIETY DISORDER, UNSPECIFIED: ICD-10-CM

## 2024-10-29 DIAGNOSIS — R87.619 UNSPECIFIED ABNORMAL CYTOLOGICAL FINDINGS IN SPECIMENS FROM CERVIX UTERI: ICD-10-CM

## 2024-10-29 DIAGNOSIS — Z72.0 TOBACCO USE: ICD-10-CM

## 2024-10-29 PROCEDURE — 99214 OFFICE O/P EST MOD 30 MIN: CPT | Mod: GC

## 2024-10-29 PROCEDURE — G2211 COMPLEX E/M VISIT ADD ON: CPT

## 2024-10-30 ENCOUNTER — APPOINTMENT (OUTPATIENT)
Dept: DERMATOLOGY | Facility: CLINIC | Age: 74
End: 2024-10-30

## 2024-11-06 ENCOUNTER — APPOINTMENT (OUTPATIENT)
Dept: DERMATOLOGY | Facility: CLINIC | Age: 74
End: 2024-11-06

## 2024-12-24 ENCOUNTER — NON-APPOINTMENT (OUTPATIENT)
Age: 74
End: 2024-12-24

## 2024-12-24 ENCOUNTER — APPOINTMENT (OUTPATIENT)
Dept: HEART AND VASCULAR | Facility: CLINIC | Age: 74
End: 2024-12-24
Payer: MEDICARE

## 2024-12-24 VITALS
OXYGEN SATURATION: 98 % | TEMPERATURE: 97.4 F | WEIGHT: 107.98 LBS | SYSTOLIC BLOOD PRESSURE: 150 MMHG | HEART RATE: 73 BPM | DIASTOLIC BLOOD PRESSURE: 74 MMHG | BODY MASS INDEX: 18.44 KG/M2 | HEIGHT: 64 IN

## 2024-12-24 DIAGNOSIS — R06.02 SHORTNESS OF BREATH: ICD-10-CM

## 2024-12-24 DIAGNOSIS — R94.31 ABNORMAL ELECTROCARDIOGRAM [ECG] [EKG]: ICD-10-CM

## 2024-12-24 PROCEDURE — G2211 COMPLEX E/M VISIT ADD ON: CPT

## 2024-12-24 PROCEDURE — 93000 ELECTROCARDIOGRAM COMPLETE: CPT

## 2024-12-24 PROCEDURE — 99214 OFFICE O/P EST MOD 30 MIN: CPT

## 2024-12-26 LAB
ALBUMIN SERPL ELPH-MCNC: 4.5 G/DL
ALP BLD-CCNC: 70 U/L
ALT SERPL-CCNC: 27 U/L
ANION GAP SERPL CALC-SCNC: 15 MMOL/L
AST SERPL-CCNC: 31 U/L
BILIRUB SERPL-MCNC: 0.3 MG/DL
BUN SERPL-MCNC: 26 MG/DL
CALCIUM SERPL-MCNC: 10 MG/DL
CHLORIDE SERPL-SCNC: 105 MMOL/L
CHOLEST SERPL-MCNC: 224 MG/DL
CO2 SERPL-SCNC: 20 MMOL/L
CREAT SERPL-MCNC: 0.69 MG/DL
EGFR: 92 ML/MIN/1.73M2
ESTIMATED AVERAGE GLUCOSE: 103 MG/DL
GLUCOSE SERPL-MCNC: 100 MG/DL
HBA1C MFR BLD HPLC: 5.2 %
HDLC SERPL-MCNC: 89 MG/DL
LDLC SERPL CALC-MCNC: 109 MG/DL
NONHDLC SERPL-MCNC: 135 MG/DL
POTASSIUM SERPL-SCNC: 4.6 MMOL/L
PROT SERPL-MCNC: 6.6 G/DL
SODIUM SERPL-SCNC: 141 MMOL/L
TRIGL SERPL-MCNC: 157 MG/DL

## 2024-12-27 ENCOUNTER — RX RENEWAL (OUTPATIENT)
Age: 74
End: 2024-12-27

## 2025-01-23 ENCOUNTER — APPOINTMENT (OUTPATIENT)
Dept: INTERNAL MEDICINE | Facility: CLINIC | Age: 75
End: 2025-01-23
Payer: MEDICARE

## 2025-01-23 VITALS — DIASTOLIC BLOOD PRESSURE: 53 MMHG | SYSTOLIC BLOOD PRESSURE: 147 MMHG

## 2025-01-23 VITALS
HEIGHT: 64 IN | DIASTOLIC BLOOD PRESSURE: 59 MMHG | SYSTOLIC BLOOD PRESSURE: 144 MMHG | OXYGEN SATURATION: 100 % | HEART RATE: 61 BPM | TEMPERATURE: 95.9 F | BODY MASS INDEX: 17.93 KG/M2 | WEIGHT: 105 LBS

## 2025-01-23 DIAGNOSIS — H52.10 MYOPIA, UNSPECIFIED EYE: ICD-10-CM

## 2025-01-23 DIAGNOSIS — R14.0 ABDOMINAL DISTENSION (GASEOUS): ICD-10-CM

## 2025-01-23 DIAGNOSIS — N89.8 OTHER SPECIFIED NONINFLAMMATORY DISORDERS OF VAGINA: ICD-10-CM

## 2025-01-23 DIAGNOSIS — I10 ESSENTIAL (PRIMARY) HYPERTENSION: ICD-10-CM

## 2025-01-23 PROCEDURE — 99214 OFFICE O/P EST MOD 30 MIN: CPT

## 2025-01-23 PROCEDURE — G2211 COMPLEX E/M VISIT ADD ON: CPT

## 2025-02-18 ENCOUNTER — APPOINTMENT (OUTPATIENT)
Dept: INTERNAL MEDICINE | Facility: CLINIC | Age: 75
End: 2025-02-18

## 2025-02-25 ENCOUNTER — APPOINTMENT (OUTPATIENT)
Dept: OBGYN | Facility: CLINIC | Age: 75
End: 2025-02-25

## 2025-02-25 VITALS — HEART RATE: 67 BPM | DIASTOLIC BLOOD PRESSURE: 80 MMHG | OXYGEN SATURATION: 98 % | SYSTOLIC BLOOD PRESSURE: 120 MMHG

## 2025-02-25 DIAGNOSIS — N90.89 OTHER SPECIFIED NONINFLAMMATORY DISORDERS OF VULVA AND PERINEUM: ICD-10-CM

## 2025-02-25 PROCEDURE — 99214 OFFICE O/P EST MOD 30 MIN: CPT | Mod: 25

## 2025-02-25 PROCEDURE — 56605 BIOPSY OF VULVA/PERINEUM: CPT

## 2025-02-25 PROCEDURE — 99459 PELVIC EXAMINATION: CPT

## 2025-02-25 RX ORDER — IMIQUIMOD 50 MG/G
5 CREAM TOPICAL DAILY
Qty: 1 | Refills: 1 | Status: ACTIVE | COMMUNITY
Start: 2025-02-25 | End: 1900-01-01

## 2025-02-25 RX ORDER — PODOFILOX 5 MG/G
0.5 GEL TOPICAL TWICE DAILY
Qty: 1 | Refills: 0 | Status: ACTIVE | COMMUNITY
Start: 2025-02-25 | End: 1900-01-01

## 2025-02-28 ENCOUNTER — OUTPATIENT (OUTPATIENT)
Dept: OUTPATIENT SERVICES | Facility: HOSPITAL | Age: 75
LOS: 1 days | End: 2025-02-28
Payer: MEDICARE

## 2025-02-28 ENCOUNTER — APPOINTMENT (OUTPATIENT)
Dept: ULTRASOUND IMAGING | Facility: HOSPITAL | Age: 75
End: 2025-02-28

## 2025-02-28 PROCEDURE — 76856 US EXAM PELVIC COMPLETE: CPT

## 2025-02-28 PROCEDURE — 76830 TRANSVAGINAL US NON-OB: CPT

## 2025-02-28 PROCEDURE — 76830 TRANSVAGINAL US NON-OB: CPT | Mod: 26

## 2025-02-28 PROCEDURE — 76856 US EXAM PELVIC COMPLETE: CPT | Mod: 26

## 2025-03-05 LAB — CORE LAB BIOPSY: NORMAL

## 2025-03-10 ENCOUNTER — NON-APPOINTMENT (OUTPATIENT)
Age: 75
End: 2025-03-10

## 2025-03-11 ENCOUNTER — NON-APPOINTMENT (OUTPATIENT)
Age: 75
End: 2025-03-11

## 2025-03-12 DIAGNOSIS — L28.0 LICHEN SIMPLEX CHRONICUS: ICD-10-CM

## 2025-03-12 RX ORDER — CLOBETASOL PROPIONATE 0.25 MG/G
0.03 CREAM TOPICAL
Qty: 1 | Refills: 0 | Status: ACTIVE | COMMUNITY
Start: 2025-03-12 | End: 1900-01-01

## 2025-03-17 RX ORDER — BETAMETHASONE DIPROPIONATE 0.5 MG/G
0.05 CREAM, AUGMENTED TOPICAL TWICE DAILY
Qty: 1 | Refills: 0 | Status: ACTIVE | COMMUNITY
Start: 2025-03-17 | End: 1900-01-01

## 2025-03-20 ENCOUNTER — APPOINTMENT (OUTPATIENT)
Dept: INTERNAL MEDICINE | Facility: CLINIC | Age: 75
End: 2025-03-20
Payer: MEDICARE

## 2025-03-20 VITALS
HEIGHT: 64 IN | OXYGEN SATURATION: 98 % | WEIGHT: 105 LBS | DIASTOLIC BLOOD PRESSURE: 72 MMHG | TEMPERATURE: 97.5 F | SYSTOLIC BLOOD PRESSURE: 163 MMHG | HEART RATE: 70 BPM | BODY MASS INDEX: 17.93 KG/M2

## 2025-03-20 VITALS — DIASTOLIC BLOOD PRESSURE: 70 MMHG | SYSTOLIC BLOOD PRESSURE: 158 MMHG

## 2025-03-20 DIAGNOSIS — I51.89 OTHER ILL-DEFINED HEART DISEASES: ICD-10-CM

## 2025-03-20 DIAGNOSIS — F41.9 ANXIETY DISORDER, UNSPECIFIED: ICD-10-CM

## 2025-03-20 DIAGNOSIS — I10 ESSENTIAL (PRIMARY) HYPERTENSION: ICD-10-CM

## 2025-03-20 DIAGNOSIS — Z71.2 PERSON CONSULTING FOR EXPLANATION OF EXAMINATION OR TEST FINDINGS: ICD-10-CM

## 2025-03-20 DIAGNOSIS — R06.09 OTHER FORMS OF DYSPNEA: ICD-10-CM

## 2025-03-20 DIAGNOSIS — Z12.2 ENCOUNTER FOR SCREENING FOR MALIGNANT NEOPLASM OF RESPIRATORY ORGANS: ICD-10-CM

## 2025-03-20 DIAGNOSIS — E78.5 HYPERLIPIDEMIA, UNSPECIFIED: ICD-10-CM

## 2025-03-20 DIAGNOSIS — M81.0 AGE-RELATED OSTEOPOROSIS W/OUT CURRENT PATHOLOGICAL FRACTURE: ICD-10-CM

## 2025-03-20 DIAGNOSIS — N90.89 OTHER SPECIFIED NONINFLAMMATORY DISORDERS OF VULVA AND PERINEUM: ICD-10-CM

## 2025-03-20 DIAGNOSIS — Z00.01 ENCOUNTER FOR GENERAL ADULT MEDICAL EXAMINATION WITH ABNORMAL FINDINGS: ICD-10-CM

## 2025-03-20 PROCEDURE — 99214 OFFICE O/P EST MOD 30 MIN: CPT | Mod: GC

## 2025-03-20 PROCEDURE — G2211 COMPLEX E/M VISIT ADD ON: CPT | Mod: GC

## 2025-03-20 RX ORDER — OLMESARTAN MEDOXOMIL 20 MG/1
20 TABLET, FILM COATED ORAL DAILY
Qty: 30 | Refills: 0 | Status: ACTIVE | COMMUNITY
Start: 2025-03-20 | End: 1900-01-01

## 2025-04-01 ENCOUNTER — APPOINTMENT (OUTPATIENT)
Dept: OBGYN | Facility: CLINIC | Age: 75
End: 2025-04-01

## 2025-04-01 VITALS
DIASTOLIC BLOOD PRESSURE: 70 MMHG | WEIGHT: 107 LBS | HEART RATE: 78 BPM | BODY MASS INDEX: 18.37 KG/M2 | SYSTOLIC BLOOD PRESSURE: 135 MMHG | OXYGEN SATURATION: 98 %

## 2025-04-01 DIAGNOSIS — L28.0 LICHEN SIMPLEX CHRONICUS: ICD-10-CM

## 2025-04-01 PROCEDURE — 99213 OFFICE O/P EST LOW 20 MIN: CPT

## 2025-04-08 ENCOUNTER — APPOINTMENT (OUTPATIENT)
Dept: RADIOLOGY | Facility: CLINIC | Age: 75
End: 2025-04-08
Payer: MEDICARE

## 2025-04-08 ENCOUNTER — APPOINTMENT (OUTPATIENT)
Dept: MAMMOGRAPHY | Facility: CLINIC | Age: 75
End: 2025-04-08

## 2025-04-08 ENCOUNTER — RESULT REVIEW (OUTPATIENT)
Age: 75
End: 2025-04-08

## 2025-04-08 PROCEDURE — 77085 DXA BONE DENSITY AXL VRT FX: CPT

## 2025-04-08 PROCEDURE — 77067 SCR MAMMO BI INCL CAD: CPT

## 2025-04-08 PROCEDURE — 77063 BREAST TOMOSYNTHESIS BI: CPT

## 2025-04-09 ENCOUNTER — OUTPATIENT (OUTPATIENT)
Dept: OUTPATIENT SERVICES | Facility: HOSPITAL | Age: 75
LOS: 1 days | End: 2025-04-09
Payer: MEDICARE

## 2025-04-09 ENCOUNTER — APPOINTMENT (OUTPATIENT)
Dept: CT IMAGING | Facility: HOSPITAL | Age: 75
End: 2025-04-09

## 2025-04-09 ENCOUNTER — NON-APPOINTMENT (OUTPATIENT)
Age: 75
End: 2025-04-09

## 2025-04-09 DIAGNOSIS — M81.0 AGE-RELATED OSTEOPOROSIS W/OUT CURRENT PATHOLOGICAL FRACTURE: ICD-10-CM

## 2025-04-09 PROCEDURE — 71250 CT THORAX DX C-: CPT

## 2025-04-09 PROCEDURE — 71250 CT THORAX DX C-: CPT | Mod: 26

## 2025-04-15 ENCOUNTER — APPOINTMENT (OUTPATIENT)
Dept: INTERNAL MEDICINE | Facility: CLINIC | Age: 75
End: 2025-04-15

## 2025-04-22 ENCOUNTER — APPOINTMENT (OUTPATIENT)
Dept: OPHTHALMOLOGY | Facility: CLINIC | Age: 75
End: 2025-04-22
Payer: MEDICARE

## 2025-04-22 ENCOUNTER — NON-APPOINTMENT (OUTPATIENT)
Age: 75
End: 2025-04-22

## 2025-04-22 PROCEDURE — 92083 EXTENDED VISUAL FIELD XM: CPT

## 2025-04-22 PROCEDURE — 92014 COMPRE OPH EXAM EST PT 1/>: CPT

## 2025-04-22 PROCEDURE — 92133 CPTRZD OPH DX IMG PST SGM ON: CPT

## 2025-05-01 ENCOUNTER — APPOINTMENT (OUTPATIENT)
Dept: INTERNAL MEDICINE | Facility: CLINIC | Age: 75
End: 2025-05-01
Payer: MEDICARE

## 2025-05-01 VITALS
DIASTOLIC BLOOD PRESSURE: 83 MMHG | HEIGHT: 64 IN | HEART RATE: 66 BPM | OXYGEN SATURATION: 98 % | BODY MASS INDEX: 18.44 KG/M2 | RESPIRATION RATE: 14 BRPM | WEIGHT: 108 LBS | SYSTOLIC BLOOD PRESSURE: 153 MMHG | TEMPERATURE: 98 F

## 2025-05-01 DIAGNOSIS — F41.9 ANXIETY DISORDER, UNSPECIFIED: ICD-10-CM

## 2025-05-01 DIAGNOSIS — M81.0 AGE-RELATED OSTEOPOROSIS W/OUT CURRENT PATHOLOGICAL FRACTURE: ICD-10-CM

## 2025-05-01 DIAGNOSIS — I10 ESSENTIAL (PRIMARY) HYPERTENSION: ICD-10-CM

## 2025-05-01 PROCEDURE — 99214 OFFICE O/P EST MOD 30 MIN: CPT | Mod: GC

## 2025-05-01 PROCEDURE — G2211 COMPLEX E/M VISIT ADD ON: CPT

## 2025-05-01 RX ORDER — AMLODIPINE AND OLMESARTAN MEDOXOMIL 5; 20 MG/1; MG/1
5-20 TABLET ORAL DAILY
Qty: 30 | Refills: 0 | Status: ACTIVE | COMMUNITY
Start: 2025-05-01 | End: 1900-01-01

## 2025-05-06 ENCOUNTER — APPOINTMENT (OUTPATIENT)
Dept: OBGYN | Facility: CLINIC | Age: 75
End: 2025-05-06

## 2025-05-06 VITALS — SYSTOLIC BLOOD PRESSURE: 120 MMHG | OXYGEN SATURATION: 96 % | HEART RATE: 72 BPM | DIASTOLIC BLOOD PRESSURE: 70 MMHG

## 2025-05-06 PROCEDURE — 99212 OFFICE O/P EST SF 10 MIN: CPT

## 2025-05-08 ENCOUNTER — APPOINTMENT (OUTPATIENT)
Dept: PULMONOLOGY | Facility: CLINIC | Age: 75
End: 2025-05-08
Payer: MEDICARE

## 2025-05-08 VITALS
WEIGHT: 108 LBS | DIASTOLIC BLOOD PRESSURE: 80 MMHG | HEIGHT: 64 IN | SYSTOLIC BLOOD PRESSURE: 130 MMHG | OXYGEN SATURATION: 97 % | HEART RATE: 77 BPM | TEMPERATURE: 97.9 F | BODY MASS INDEX: 18.44 KG/M2

## 2025-05-08 PROCEDURE — 99213 OFFICE O/P EST LOW 20 MIN: CPT

## 2025-05-19 ENCOUNTER — APPOINTMENT (OUTPATIENT)
Dept: INTERNAL MEDICINE | Facility: CLINIC | Age: 75
End: 2025-05-19

## 2025-05-20 ENCOUNTER — APPOINTMENT (OUTPATIENT)
Dept: INTERNAL MEDICINE | Facility: CLINIC | Age: 75
End: 2025-05-20

## 2025-05-20 VITALS
WEIGHT: 107 LBS | HEART RATE: 70 BPM | OXYGEN SATURATION: 99 % | TEMPERATURE: 98 F | SYSTOLIC BLOOD PRESSURE: 130 MMHG | BODY MASS INDEX: 19.69 KG/M2 | HEIGHT: 62 IN | DIASTOLIC BLOOD PRESSURE: 69 MMHG

## 2025-05-28 ENCOUNTER — APPOINTMENT (OUTPATIENT)
Dept: INTERNAL MEDICINE | Facility: CLINIC | Age: 75
End: 2025-05-28
Payer: MEDICARE

## 2025-05-28 VITALS
HEART RATE: 71 BPM | OXYGEN SATURATION: 96 % | BODY MASS INDEX: 19.69 KG/M2 | HEIGHT: 62 IN | TEMPERATURE: 97.2 F | DIASTOLIC BLOOD PRESSURE: 74 MMHG | SYSTOLIC BLOOD PRESSURE: 130 MMHG | WEIGHT: 107 LBS

## 2025-05-28 DIAGNOSIS — E78.5 HYPERLIPIDEMIA, UNSPECIFIED: ICD-10-CM

## 2025-05-28 DIAGNOSIS — J06.9 ACUTE UPPER RESPIRATORY INFECTION, UNSPECIFIED: ICD-10-CM

## 2025-05-28 DIAGNOSIS — I10 ESSENTIAL (PRIMARY) HYPERTENSION: ICD-10-CM

## 2025-05-28 PROCEDURE — 99214 OFFICE O/P EST MOD 30 MIN: CPT | Mod: GC,25

## 2025-05-28 PROCEDURE — 36415 COLL VENOUS BLD VENIPUNCTURE: CPT

## 2025-05-28 RX ORDER — FLUTICASONE PROPIONATE 50 UG/1
50 SPRAY, METERED NASAL DAILY
Qty: 1 | Refills: 0 | Status: ACTIVE | COMMUNITY
Start: 2025-05-28 | End: 1900-01-01

## 2025-06-10 ENCOUNTER — APPOINTMENT (OUTPATIENT)
Dept: HEART AND VASCULAR | Facility: CLINIC | Age: 75
End: 2025-06-10
Payer: MEDICARE

## 2025-06-10 VITALS
OXYGEN SATURATION: 99 % | WEIGHT: 108 LBS | SYSTOLIC BLOOD PRESSURE: 124 MMHG | HEART RATE: 74 BPM | BODY MASS INDEX: 19.88 KG/M2 | TEMPERATURE: 97.9 F | HEIGHT: 62 IN | DIASTOLIC BLOOD PRESSURE: 70 MMHG

## 2025-06-10 PROCEDURE — 36415 COLL VENOUS BLD VENIPUNCTURE: CPT

## 2025-06-10 PROCEDURE — 93000 ELECTROCARDIOGRAM COMPLETE: CPT

## 2025-06-10 PROCEDURE — G2211 COMPLEX E/M VISIT ADD ON: CPT

## 2025-06-10 PROCEDURE — 99214 OFFICE O/P EST MOD 30 MIN: CPT

## 2025-06-11 ENCOUNTER — APPOINTMENT (OUTPATIENT)
Dept: INTERNAL MEDICINE | Facility: CLINIC | Age: 75
End: 2025-06-11
Payer: MEDICARE

## 2025-06-11 VITALS
OXYGEN SATURATION: 99 % | SYSTOLIC BLOOD PRESSURE: 120 MMHG | WEIGHT: 108 LBS | BODY MASS INDEX: 19.88 KG/M2 | HEIGHT: 62 IN | DIASTOLIC BLOOD PRESSURE: 73 MMHG | HEART RATE: 77 BPM | TEMPERATURE: 97.8 F

## 2025-06-11 LAB
T4 FREE SERPL-MCNC: 1.3 NG/DL
TSH SERPL-ACNC: 1.67 UIU/ML

## 2025-06-11 PROCEDURE — G0439: CPT | Mod: GC

## 2025-07-01 ENCOUNTER — APPOINTMENT (OUTPATIENT)
Dept: INTERNAL MEDICINE | Facility: CLINIC | Age: 75
End: 2025-07-01
Payer: MEDICARE

## 2025-07-01 PROBLEM — Z00.00 HEALTH MAINTENANCE EXAMINATION: Status: ACTIVE | Noted: 2025-06-11

## 2025-07-01 PROCEDURE — 36415 COLL VENOUS BLD VENIPUNCTURE: CPT

## 2025-07-09 ENCOUNTER — APPOINTMENT (OUTPATIENT)
Dept: ENDOCRINOLOGY | Facility: CLINIC | Age: 75
End: 2025-07-09
Payer: MEDICARE

## 2025-07-09 VITALS
WEIGHT: 108 LBS | SYSTOLIC BLOOD PRESSURE: 131 MMHG | BODY MASS INDEX: 19.88 KG/M2 | DIASTOLIC BLOOD PRESSURE: 80 MMHG | OXYGEN SATURATION: 97 % | HEIGHT: 62 IN | TEMPERATURE: 97.6 F | HEART RATE: 67 BPM

## 2025-07-09 PROCEDURE — G2211 COMPLEX E/M VISIT ADD ON: CPT

## 2025-07-09 PROCEDURE — 99204 OFFICE O/P NEW MOD 45 MIN: CPT

## 2025-07-29 ENCOUNTER — APPOINTMENT (OUTPATIENT)
Dept: DERMATOLOGY | Facility: CLINIC | Age: 75
End: 2025-07-29
Payer: MEDICARE

## 2025-07-29 DIAGNOSIS — L92.0 GRANULOMA ANNULARE: ICD-10-CM

## 2025-07-29 PROCEDURE — 99213 OFFICE O/P EST LOW 20 MIN: CPT

## 2025-08-15 ENCOUNTER — RX RENEWAL (OUTPATIENT)
Age: 75
End: 2025-08-15

## 2025-08-21 ENCOUNTER — APPOINTMENT (OUTPATIENT)
Dept: INTERNAL MEDICINE | Facility: CLINIC | Age: 75
End: 2025-08-21
Payer: MEDICARE

## 2025-08-21 ENCOUNTER — TRANSCRIPTION ENCOUNTER (OUTPATIENT)
Age: 75
End: 2025-08-21

## 2025-08-21 VITALS
WEIGHT: 109 LBS | DIASTOLIC BLOOD PRESSURE: 74 MMHG | OXYGEN SATURATION: 99 % | HEART RATE: 70 BPM | HEIGHT: 62 IN | BODY MASS INDEX: 20.06 KG/M2 | TEMPERATURE: 97.1 F | SYSTOLIC BLOOD PRESSURE: 156 MMHG

## 2025-08-21 DIAGNOSIS — R53.83 OTHER FATIGUE: ICD-10-CM

## 2025-08-21 DIAGNOSIS — E78.5 HYPERLIPIDEMIA, UNSPECIFIED: ICD-10-CM

## 2025-08-21 DIAGNOSIS — M81.0 AGE-RELATED OSTEOPOROSIS W/OUT CURRENT PATHOLOGICAL FRACTURE: ICD-10-CM

## 2025-08-21 DIAGNOSIS — I10 ESSENTIAL (PRIMARY) HYPERTENSION: ICD-10-CM

## 2025-08-21 PROCEDURE — G2211 COMPLEX E/M VISIT ADD ON: CPT | Mod: GC

## 2025-08-21 PROCEDURE — 99214 OFFICE O/P EST MOD 30 MIN: CPT | Mod: GC

## 2025-08-21 RX ORDER — PSYLLIUM HUSK 0.4 G
CAPSULE ORAL
Refills: 0 | Status: ACTIVE | COMMUNITY

## 2025-08-28 ENCOUNTER — TRANSCRIPTION ENCOUNTER (OUTPATIENT)
Age: 75
End: 2025-08-28

## 2025-09-02 ENCOUNTER — APPOINTMENT (OUTPATIENT)
Dept: INTERNAL MEDICINE | Facility: CLINIC | Age: 75
End: 2025-09-02

## 2025-09-09 ENCOUNTER — APPOINTMENT (OUTPATIENT)
Dept: INTERNAL MEDICINE | Facility: CLINIC | Age: 75
End: 2025-09-09

## 2025-09-19 ENCOUNTER — TRANSCRIPTION ENCOUNTER (OUTPATIENT)
Age: 75
End: 2025-09-19